# Patient Record
Sex: FEMALE | Race: WHITE | NOT HISPANIC OR LATINO | Employment: OTHER | URBAN - METROPOLITAN AREA
[De-identification: names, ages, dates, MRNs, and addresses within clinical notes are randomized per-mention and may not be internally consistent; named-entity substitution may affect disease eponyms.]

---

## 2017-01-12 ENCOUNTER — TRANSFERRED RECORDS (OUTPATIENT)
Dept: FAMILY MEDICINE | Facility: CLINIC | Age: 62
End: 2017-01-12

## 2017-01-17 ENCOUNTER — TRANSFERRED RECORDS (OUTPATIENT)
Dept: FAMILY MEDICINE | Facility: CLINIC | Age: 62
End: 2017-01-17

## 2017-01-18 ENCOUNTER — HOSPITAL ENCOUNTER (OUTPATIENT)
Dept: LAB | Facility: CLINIC | Age: 62
Discharge: HOME | End: 2017-01-18
Attending: PSYCHIATRY & NEUROLOGY | Admitting: PSYCHIATRY & NEUROLOGY
Payer: COMMERCIAL

## 2017-01-18 DIAGNOSIS — M79.7 FIBROMYALGIA: Primary | ICD-10-CM

## 2017-01-18 DIAGNOSIS — I10 HTN (HYPERTENSION): ICD-10-CM

## 2017-01-18 DIAGNOSIS — G43.909 MIGRAINE: ICD-10-CM

## 2017-01-18 DIAGNOSIS — G43.101 MIGRAINE WITH AURA AND WITH STATUS MIGRAINOSUS, NOT INTRACTABLE: ICD-10-CM

## 2017-01-18 DIAGNOSIS — R53.82 CHRONIC FATIGUE: ICD-10-CM

## 2017-01-18 LAB — DEPRECATED CALCIDIOL+CALCIFEROL SERPL-MC: 44 UG/L (ref 20–75)

## 2017-01-18 PROCEDURE — 83516 IMMUNOASSAY NONANTIBODY: CPT | Performed by: PSYCHIATRY & NEUROLOGY

## 2017-01-18 PROCEDURE — 83519 RIA NONANTIBODY: CPT | Mod: 91 | Performed by: PSYCHIATRY & NEUROLOGY

## 2017-01-18 PROCEDURE — 83516 IMMUNOASSAY NONANTIBODY: CPT | Mod: 91 | Performed by: PSYCHIATRY & NEUROLOGY

## 2017-01-18 PROCEDURE — 36415 COLL VENOUS BLD VENIPUNCTURE: CPT | Performed by: PSYCHIATRY & NEUROLOGY

## 2017-01-18 PROCEDURE — 86255 FLUORESCENT ANTIBODY SCREEN: CPT | Performed by: PSYCHIATRY & NEUROLOGY

## 2017-01-18 PROCEDURE — 82306 VITAMIN D 25 HYDROXY: CPT | Performed by: PSYCHIATRY & NEUROLOGY

## 2017-01-19 LAB
ACHR BIND AB SER-SCNC: 0 NMOL/L
ACHR BLOCK AB/ACHR TOTAL SFR SER: 0 %
STRIA MUS IGG SER QL IF: NORMAL

## 2017-01-20 LAB — ACHR MOD AB/ACHR TOTAL SFR SER: 0 %

## 2017-05-08 ENCOUNTER — TRANSFERRED RECORDS (OUTPATIENT)
Dept: FAMILY MEDICINE | Facility: CLINIC | Age: 62
End: 2017-05-08

## 2017-09-01 ENCOUNTER — HEALTH MAINTENANCE LETTER (OUTPATIENT)
Age: 62
End: 2017-09-01

## 2018-01-09 ENCOUNTER — NURSE TRIAGE (OUTPATIENT)
Dept: NURSING | Facility: CLINIC | Age: 63
End: 2018-01-09

## 2018-01-10 NOTE — TELEPHONE ENCOUNTER
Pt takes Bupropion 200mg in the morning.  By mistake she took another 200mg of Bupropion this evening with her Lyrica and Trazodone.  Pt denies having any s/s.  Connected her with the poison control pharmacist.      Reason for Disposition    MORE THAN A DOUBLE DOSE of a prescription or over-the-counter (OTC) drug    Additional Information    Negative: Drug overdose and nurse unable to answer question    Negative: Caller requesting information not related to medicine    Negative: Caller requesting a prescription for Strep throat and has a positive culture result    Negative: Rash while taking a medication or within 3 days of stopping it    Negative: Immunization reaction suspected    Negative: [1] Asthma and [2] having symptoms of asthma (cough, wheezing, etc)    Protocols used: MEDICATION QUESTION CALL-ADULT-

## 2018-04-05 ENCOUNTER — OFFICE VISIT (OUTPATIENT)
Dept: FAMILY MEDICINE | Facility: CLINIC | Age: 63
End: 2018-04-05

## 2018-04-05 VITALS
BODY MASS INDEX: 34.37 KG/M2 | HEART RATE: 87 BPM | TEMPERATURE: 98.9 F | OXYGEN SATURATION: 91 % | SYSTOLIC BLOOD PRESSURE: 124 MMHG | WEIGHT: 194 LBS | DIASTOLIC BLOOD PRESSURE: 74 MMHG

## 2018-04-05 DIAGNOSIS — R05.3 CHRONIC COUGH: Primary | ICD-10-CM

## 2018-04-05 DIAGNOSIS — Z12.39 SCREENING FOR MALIGNANT NEOPLASM OF BREAST: ICD-10-CM

## 2018-04-05 PROCEDURE — 99213 OFFICE O/P EST LOW 20 MIN: CPT | Performed by: FAMILY MEDICINE

## 2018-04-05 RX ORDER — OMEPRAZOLE 40 MG/1
40 CAPSULE, DELAYED RELEASE ORAL DAILY
Qty: 30 CAPSULE | Refills: 1 | Status: SHIPPED | OUTPATIENT
Start: 2018-04-05 | End: 2018-11-29

## 2018-04-05 RX ORDER — BENZONATATE 200 MG/1
200 CAPSULE ORAL 3 TIMES DAILY PRN
Qty: 30 CAPSULE | Refills: 1 | Status: SHIPPED | OUTPATIENT
Start: 2018-04-05 | End: 2018-11-29

## 2018-04-05 RX ORDER — BUPROPION HYDROCHLORIDE 100 MG/1
100 TABLET ORAL 2 TIMES DAILY
COMMUNITY
End: 2018-11-29 | Stop reason: DRUGHIGH

## 2018-04-05 NOTE — PROGRESS NOTES
SUBJECTIVE:  62 year old female presents with the following concern:    Since last September she has been bothered with an intermittent cough-    Coughing for the past two weeks-  Runny nose at times  Cough is dry  Loosing sleep  No fever  No body aches  At times short of breath- little crackles  No history of asthma   No history of allergies  No fever  Not taking omeprazole- she does have symptoms of heart burn     works at [employer] as a -did a strep test- negative  Patient Active Problem List   Diagnosis     Migraine with aura     Obesity     Primary localized osteoarthrosis, lower leg     Sleep apnea syndrome     Cervicalgia     Brachial neuritis or radiculitis     Myalgia and myositis     Health Care Home     Other and unspecified noninfectious gastroenteritis and colitis(558.9)     Pancreatitis     Gastroesophageal reflux disease without esophagitis     ACP (advance care planning)     S/P total knee arthroplasty     Fibromyalgia     Chronic fatigue     Past Surgical History:   Procedure Laterality Date     ARTHROPLASTY KNEE Left 9/15/2015    Procedure: ARTHROPLASTY KNEE;  Surgeon: Gavin Marcos MD;  Location: [facility] OR     ARTHROTOMY SHOULDER, ROTATOR CUFF REPAIR, COMBINED  5/22/2012    Procedure:COMBINED ARTHROTOMY SHOULDER, ROTATOR CUFF REPAIR; Left Shoulder Open Rotator Cuff Repair        C TOTAL KNEE ARTHROPLASTY  12/2007    [facility] knee/ Priti     COLONOSCOPY  1/4/2015    repeat 10 yrs     COSMETIC SURGERY      upper eyelid surgery     HC COLONOSCOPY THRU STOMA, DIAGNOSTIC  8/31/05    collagenous colitis; repeat in 10 years     HC KNEE SCOPE, DIAGNOSTIC  9/2007    Arthroscopy, Knee/ Dr Marcos     HC REMOVAL GALLBLADDER  1977    Cholecystectomy     UPPER GI ENDOSCOPY  7/2010    Normal     Current Outpatient Prescriptions   Medication     buPROPion (WELLBUTRIN) 100 MG tablet     omeprazole (PRILOSEC) 40 MG capsule     benzonatate (TESSALON) 200 MG capsule     clobetasol propionate 0.05 %  SHAM     pregabalin (LYRICA) 150 MG capsule     multivitamin, therapeutic with minerals (THERA-VIT-M) TABS     traZODone (DESYREL) 50 MG tablet     SUMAtriptan (IMITREX) 50 MG tablet     Cholecalciferol (VITAMIN D3 PO)     No current facility-administered medications for this visit.      OBJECTIVE:  /74 (BP Location: Left arm, Patient Position: Sitting, Cuff Size: Adult Regular)  Pulse 87  Temp 98.9  F (37.2  C) (Oral)  Wt 88 kg (194 lb)  LMP 02/12/2001  SpO2 91%  BMI 34.37 kg/m2  No acute distress  External ears  and canals clear bilaterally. TM's normal bilaterally. Nose normal without lesions or discharge. Oropharynx normal. Neck supple without palpable adenopathy.  Chest is clear  Heart; RRR without significant murmur    Assessment   Chronic, intermittent cough- ?? GERD  Acute cough symptoms- ? viral    PLAN:  Trial PPI  Cough suppressant  Call back with progress-

## 2018-04-05 NOTE — MR AVS SNAPSHOT
After Visit Summary   4/5/2018    Landy Fierro    MRN: [id number]           Patient Information     Date Of Birth          1955        Visit Information        Provider Department      4/5/2018 10:45 AM Sandra Olivera MD [facility]        Today's Diagnoses     Chronic cough    -  1    Screening for malignant neoplasm of breast           Follow-ups after your visit        Who to contact     If you have questions or need follow up information about today's clinic visit or your schedule please contact [facility] directly at [phone].  Normal or non-critical lab and imaging results will be communicated to you by [employer], letter or phone within 4 business days after the clinic has received the results. If you do not hear from us within 7 days, please contact the clinic through [employer] or phone. If you have a critical or abnormal lab result, we will notify you by phone as soon as possible.  Submit refill requests through [employer] or call your pharmacy and they will forward the refill request to us. Please allow 3 business days for your refill to be completed.          Additional Information About Your Visit        MyChart Information     [employer] gives you secure access to your electronic health record. If you see a primary care provider, you can also send messages to your care team and make appointments. If you have questions, please call your primary care clinic.  If you do not have a primary care provider, please call [phone] and they will assist you.        Care EveryWhere ID     This is your Care EveryWhere ID. This could be used by other organizations to access your [facility] medical records  [id number]        Your Vitals Were     Pulse Temperature Last Period Pulse Oximetry BMI (Body Mass Index)       87 98.9  F (37.2  C) (Oral) 02/12/2001 91% 34.37 kg/m2        Blood Pressure from Last 3 Encounters:   04/05/18 124/74   05/24/16  106/76   09/18/15 104/40    Weight from Last 3 Encounters:   04/05/18 88 kg (194 lb)   05/24/16 92.1 kg (203 lb)   09/15/15 90.9 kg (200 lb 6.4 oz)                 Today's Medication Changes          These changes are accurate as of 4/5/18 11:59 PM.  If you have any questions, ask your nurse or doctor.               Start taking these medicines.        Dose/Directions    benzonatate 200 MG capsule   Commonly known as:  TESSALON   Used for:  Chronic cough   Started by:  Sandra Olivera MD        Dose:  200 mg   Take 1 capsule (200 mg) by mouth 3 times daily as needed for cough   Quantity:  30 capsule   Refills:  1       omeprazole 40 MG capsule   Commonly known as:  priLOSEC   Used for:  Chronic cough   Started by:  Sandra Olivera MD        Dose:  40 mg   Take 1 capsule (40 mg) by mouth daily Take 30-60 minutes before a meal.   Quantity:  30 capsule   Refills:  1            Where to get your medicines      These medications were sent to [facility]/pharmacy #[id number] - [place], [place] - [address]  [address] [address] [address]    Hours:  [facility] [employer] Phone:  [phone]     benzonatate 200 MG capsule    omeprazole 40 MG capsule                Primary Care Provider Office Phone # Fax #    Sandra Olivera [phone] [address] [address]        Equal Access to Services     [facility] AH: Hadii aad ku hadasho Soomaali, waaxda luqadaha, qaybta kaalmada adeegyada, waxay ines joseph ah. So [facility] [phone].    ATENCIÓN: Si habla español, tiene a almazan disposición servicios gratuitos de asistencia lingüística. Llame al [phone].    We comply with applicable federal civil rights laws and [place] laws. We do not discriminate on the basis of race, color, national origin, age, disability, sex, sexual orientation, or gender identity.            Thank you!     Thank you for choosing [facility]   for your care. Our goal is always to provide you with excellent care. Hearing back from our patients is one way we can continue to improve our services. Please take a few minutes to complete the written survey that you may receive in the mail after your visit with us. Thank you!             Your Updated Medication List - Protect others around you: Learn how to safely use, store and throw away your medicines at www.disposemymeds.org.          This list is accurate as of 4/5/18 11:59 PM.  Always use your most recent med list.                   Brand Name Dispense Instructions for use Diagnosis    benzonatate 200 MG capsule    TESSALON    30 capsule    Take 1 capsule (200 mg) by mouth 3 times daily as needed for cough    Chronic cough       clobetasol propionate 0.05 % Sham     1 Bottle    Apply topically daily as needed Apply sparingly to dry scalp once daily as needed.  Leave in place for 15 minutes then add water, lather and rinse thoroughly. Use for no more than 10 days    Eczema, unspecified type       LYRICA 150 MG capsule   Generic drug:  pregabalin      Take 150 mg by mouth 2 times daily        multivitamin, therapeutic with minerals Tabs tablet      Take 1 tablet by mouth every other day        omeprazole 40 MG capsule    priLOSEC    30 capsule    Take 1 capsule (40 mg) by mouth daily Take 30-60 minutes before a meal.    Chronic cough       SUMAtriptan 50 MG tablet    IMITREX     Take 1 tablet by mouth at onset of headache.        traZODone 50 MG tablet    DESYREL     Take 100 mg by mouth At Bedtime        VITAMIN D3 PO      Take 1,000 Units by mouth daily        WELLBUTRIN 100 MG tablet   Generic drug:  buPROPion      Take 100 mg by mouth 2 times daily

## 2018-04-06 ENCOUNTER — MYC MEDICAL ADVICE (OUTPATIENT)
Dept: FAMILY MEDICINE | Facility: CLINIC | Age: 63
End: 2018-04-06

## 2018-04-06 NOTE — TELEPHONE ENCOUNTER
From: Landy Fierro  To: Sandra Olivera MD  Sent: 4/6/2018 12:10 PM CDT  Subject: Updates about my health    Over night, my nose drainage has changed to yellowish opaque. Wondering if I am dealing with the virus my  is. He is under Dr Morley's care. I will stay with our plan unless I hear different from you. I have had a little relief with the benzonatate but not as much as I hoped for. Have a nice weekend. Landy

## 2018-04-10 ENCOUNTER — MYC MEDICAL ADVICE (OUTPATIENT)
Dept: FAMILY MEDICINE | Facility: CLINIC | Age: 63
End: 2018-04-10

## 2018-04-10 DIAGNOSIS — J01.90 ACUTE SINUSITIS WITH SYMPTOMS > 10 DAYS: Primary | ICD-10-CM

## 2018-04-10 RX ORDER — AZITHROMYCIN 250 MG/1
TABLET, FILM COATED ORAL
Qty: 6 TABLET | Refills: 0 | Status: SHIPPED | OUTPATIENT
Start: 2018-04-10 | End: 2018-11-29

## 2018-04-10 NOTE — TELEPHONE ENCOUNTER
From: Landy Fierro  To: Sandra Olivera MD  Sent: 4/10/2018 1:16 PM CDT  Subject: Updates about my health    My nasal discharge is yellow/yellowgreenish. With dabs of Vicks under my nose, I am keeping my nose running. This is keeping my sinus and nasal passages decongested. Coughing is still an issue, as will be for some time, I am sure. If you feel I should be on an antibiotic, please call one into [facility]. I would prefer not to be on an antibiotc but I will if it is necessary.

## 2018-06-08 ENCOUNTER — TRANSFERRED RECORDS (OUTPATIENT)
Dept: FAMILY MEDICINE | Facility: CLINIC | Age: 63
End: 2018-06-08

## 2018-06-14 ENCOUNTER — TRANSFERRED RECORDS (OUTPATIENT)
Dept: FAMILY MEDICINE | Facility: CLINIC | Age: 63
End: 2018-06-14

## 2018-06-19 ENCOUNTER — TRANSFERRED RECORDS (OUTPATIENT)
Dept: FAMILY MEDICINE | Facility: CLINIC | Age: 63
End: 2018-06-19

## 2018-07-30 ENCOUNTER — TRANSFERRED RECORDS (OUTPATIENT)
Dept: FAMILY MEDICINE | Facility: CLINIC | Age: 63
End: 2018-07-30

## 2018-09-07 ENCOUNTER — HEALTH MAINTENANCE LETTER (OUTPATIENT)
Age: 63
End: 2018-09-07

## 2018-11-29 ENCOUNTER — OFFICE VISIT (OUTPATIENT)
Dept: FAMILY MEDICINE | Facility: CLINIC | Age: 63
End: 2018-11-29

## 2018-11-29 VITALS
TEMPERATURE: 98 F | SYSTOLIC BLOOD PRESSURE: 110 MMHG | BODY MASS INDEX: 33.13 KG/M2 | HEIGHT: 63 IN | HEART RATE: 63 BPM | WEIGHT: 187 LBS | DIASTOLIC BLOOD PRESSURE: 70 MMHG | OXYGEN SATURATION: 99 %

## 2018-11-29 DIAGNOSIS — Z23 NEED FOR VACCINATION: ICD-10-CM

## 2018-11-29 DIAGNOSIS — Z00.00 ROUTINE HISTORY AND PHYSICAL EXAMINATION OF ADULT: Primary | ICD-10-CM

## 2018-11-29 DIAGNOSIS — G43.911 INTRACTABLE MIGRAINE WITH STATUS MIGRAINOSUS, UNSPECIFIED MIGRAINE TYPE: ICD-10-CM

## 2018-11-29 LAB
ERYTHROCYTE [DISTWIDTH] IN BLOOD BY AUTOMATED COUNT: 12.9 %
HCT VFR BLD AUTO: 46.3 % (ref 35–47)
HEMOGLOBIN: 15.4 G/DL (ref 11.7–15.7)
MCH RBC QN AUTO: 30.8 PG (ref 26–33)
MCHC RBC AUTO-ENTMCNC: 33.3 G/DL (ref 31–36)
MCV RBC AUTO: 92.5 FL (ref 78–100)
PLATELET COUNT - QUEST: 272 10^9/L (ref 150–375)
RBC # BLD AUTO: 5 10*12/L (ref 3.8–5.2)
WBC # BLD AUTO: 5.4 10*9/L (ref 4–11)

## 2018-11-29 PROCEDURE — 87624 HPV HI-RISK TYP POOLED RSLT: CPT | Mod: 90 | Performed by: FAMILY MEDICINE

## 2018-11-29 PROCEDURE — 86803 HEPATITIS C AB TEST: CPT | Mod: 90 | Performed by: FAMILY MEDICINE

## 2018-11-29 PROCEDURE — 87389 HIV-1 AG W/HIV-1&-2 AB AG IA: CPT | Mod: 90 | Performed by: FAMILY MEDICINE

## 2018-11-29 PROCEDURE — 80048 BASIC METABOLIC PNL TOTAL CA: CPT | Mod: 90 | Performed by: FAMILY MEDICINE

## 2018-11-29 PROCEDURE — G0101 CA SCREEN;PELVIC/BREAST EXAM: HCPCS | Performed by: FAMILY MEDICINE

## 2018-11-29 PROCEDURE — 85027 COMPLETE CBC AUTOMATED: CPT | Performed by: FAMILY MEDICINE

## 2018-11-29 PROCEDURE — 80061 LIPID PANEL: CPT | Mod: 90 | Performed by: FAMILY MEDICINE

## 2018-11-29 PROCEDURE — 36415 COLL VENOUS BLD VENIPUNCTURE: CPT | Performed by: FAMILY MEDICINE

## 2018-11-29 PROCEDURE — 84460 ALANINE AMINO (ALT) (SGPT): CPT | Mod: 90 | Performed by: FAMILY MEDICINE

## 2018-11-29 PROCEDURE — 90670 PCV13 VACCINE IM: CPT | Performed by: FAMILY MEDICINE

## 2018-11-29 PROCEDURE — 99397 PER PM REEVAL EST PAT 65+ YR: CPT | Performed by: FAMILY MEDICINE

## 2018-11-29 PROCEDURE — G0009 ADMIN PNEUMOCOCCAL VACCINE: HCPCS | Performed by: FAMILY MEDICINE

## 2018-11-29 RX ORDER — ONDANSETRON 4 MG/1
4 TABLET, FILM COATED ORAL EVERY 8 HOURS PRN
Qty: 10 TABLET | Refills: 0 | Status: SHIPPED | OUTPATIENT
Start: 2018-11-29 | End: 2021-11-29

## 2018-11-29 RX ORDER — BUPROPION HYDROCHLORIDE 300 MG/1
300 TABLET ORAL DAILY
Refills: 1 | COMMUNITY
Start: 2018-11-01

## 2018-11-29 ASSESSMENT — PATIENT HEALTH QUESTIONNAIRE - PHQ9: SUM OF ALL RESPONSES TO PHQ QUESTIONS 1-9: 12

## 2018-11-29 NOTE — NURSING NOTE
Patient is here for a full physical exam.    Pre-Visit Screening :  Immunizations : not up to date - due for pneumo vaccines   Colon Screening : is up to date  Mammogram: is due and patient will get scheduled soon  Asthma Action Test/Plan : No concerns  PHQ9 :  Patient sees psychiatry   GAD7 :  Sees psychiatrist   Patient's  BMI Body mass index is 33.13 kg/(m^2).  Questioned patient about current smoking habits.  Pt. has never smoked.  OK to leave a detailed voice message regarding today's visit Yes, phone # [phone]      ETOH screening:  Questions:  1-How often do you have a drink containing alcohol?                             1-2 times per year(s)  2-How many drinks containing alcohol do you have on a typical day when you are         Drinking?                              1   3- How often do you have 5 or more drinks on one occasion?                              never

## 2018-11-29 NOTE — MR AVS SNAPSHOT
After Visit Summary   11/29/2018    Landy Fierro    MRN: [id number]           Patient Information     Date Of Birth          1955        Visit Information        Provider Department      11/29/2018 11:00 AM Sandra Olivera MD [facility]        Today's Diagnoses     Routine history and physical examination of adult    -  1    Need for vaccination        Intractable migraine with status migrainosus, unspecified migraine type           Follow-ups after your visit        Who to contact     If you have questions or need follow up information about today's clinic visit or your schedule please contact [facility] directly at [phone].  Normal or non-critical lab and imaging results will be communicated to you by MyChart, letter or phone within 4 business days after the clinic has received the results. If you do not hear from us within 7 days, please contact the clinic through [employer] or phone. If you have a critical or abnormal lab result, we will notify you by phone as soon as possible.  Submit refill requests through [employer] or call your pharmacy and they will forward the refill request to us. Please allow 3 business days for your refill to be completed.          Additional Information About Your Visit        MyChart Information     [employer] gives you secure access to your electronic health record. If you see a primary care provider, you can also send messages to your care team and make appointments. If you have questions, please call your primary care clinic.  If you do not have a primary care provider, please call [phone] and they will assist you.        Care EveryWhere ID     This is your Care EveryWhere ID. This could be used by other organizations to access your [facility] medical records  [id number]        Your Vitals Were     Pulse Temperature Height Last Period Pulse Oximetry BMI (Body Mass Index)    63 98  F (36.7  C) (Oral) 1.6 m  "(5' 3\") 02/12/2001 99% 33.13 kg/m2       Blood Pressure from Last 3 Encounters:   11/29/18 110/70   04/05/18 124/74   05/24/16 106/76    Weight from Last 3 Encounters:   11/29/18 84.8 kg (187 lb)   04/05/18 88 kg (194 lb)   05/24/16 92.1 kg (203 lb)              We Performed the Following     ALT ([facility])     BASIC METABOLIC PANEL ([facility])     HEMOGRAM/PLATELET (BFP)     Hepatits C antibody ([facility])     HIV 1/2 Agn Mery 4th Gen w Reflex ([facility])     Lipid Profile     PNEUMOCOCCAL CONJ VACCINE 13 VALENT IM     ThinPrep Pap and HPV (mRNa E6/E7) {HPV always run}([facility])     VACCINE ADMINISTRATION, INITIAL     VENOUS COLLECTION          Today's Medication Changes          These changes are accurate as of 11/29/18 11:59 PM.  If you have any questions, ask your nurse or doctor.               Start taking these medicines.        Dose/Directions    ondansetron 4 MG tablet   Commonly known as:  ZOFRAN   Used for:  Intractable migraine with status migrainosus, unspecified migraine type   Started by:  Sandra Olivera MD        Dose:  4 mg   Take 1 tablet (4 mg) by mouth every 8 hours as needed for nausea   Quantity:  10 tablet   Refills:  0         These medicines have changed or have updated prescriptions.        Dose/Directions    buPROPion 300 MG 24 hr tablet   Commonly known as:  WELLBUTRIN XL   This may have changed:  Another medication with the same name was removed. Continue taking this medication, and follow the directions you see here.   Changed by:  Sandra Olivera MD        Dose:  300 mg   Take 300 mg by mouth daily   Refills:  1            Where to get your medicines      These medications were sent to [facility]/pharmacy #[id number] - [place], [place] - [address]  [address] [address]    Hours:  [facility] [employer] Phone:  [phone]     ondansetron 4 MG tablet                Primary Care Provider Office Phone # Fax #    Sandra Olivera [phone]       [address] [facility] [address]  [facility] [address]        Equal Access to Services     JOHNNY GUZMAN : Hadii aad ku hadsonnycheyenne Sothiago, warajatda lubebeadaha, qaybta kapaulisabelle mckeonmitaisabelle, wendie larkinmellismael clark. So [facility] [phone].    ATENCIÓN: Si habla español, tiene a almazan disposición servicios gratuitos de asistencia lingüística. Llame al [phone].    We comply with applicable federal civil rights laws and [place] laws. We do not discriminate on the basis of race, color, national origin, age, disability, sex, sexual orientation, or gender identity.            Thank you!     Thank you for choosing [facility]  for your care. Our goal is always to provide you with excellent care. Hearing back from our patients is one way we can continue to improve our services. Please take a few minutes to complete the written survey that you may receive in the mail after your visit with us. Thank you!             Your Updated Medication List - Protect others around you: Learn how to safely use, store and throw away your medicines at www.disposemymeds.org.          This list is accurate as of 11/29/18 11:59 PM.  Always use your most recent med list.                   Brand Name Dispense Instructions for use Diagnosis    buPROPion 300 MG 24 hr tablet    WELLBUTRIN XL     Take 300 mg by mouth daily        LYRICA 150 MG capsule   Generic drug:  pregabalin      Take 150 mg by mouth 2 times daily        multivitamin w/minerals tablet      Take 1 tablet by mouth every other day        ondansetron 4 MG tablet    ZOFRAN    10 tablet    Take 1 tablet (4 mg) by mouth every 8 hours as needed for nausea    Intractable migraine with status migrainosus, unspecified migraine type       SUMAtriptan 50 MG tablet    IMITREX     Take 1 tablet by mouth at onset of headache.        traZODone 50 MG tablet    DESYREL     Take 100 mg by mouth At Bedtime        VITAMIN D3 PO      Take 1,000 Units by mouth daily

## 2018-11-29 NOTE — PROGRESS NOTES
Chief Complaint: Landy Fierro is an 63 year old woman who presents for preventive health visit.      Besides routine health maintenance,  she would like to discuss :.  Under a lot of stress-  Adult daughter financial problems,  health problems, her health problems    Fibromyalgia- chronic fatigue- symptoms not controlled  Obesity- weight is down almost ten pounds this year  History of pancreatitis: no recent bouts  Sleep apnea     Review of medications- doctors involved in her care  [facility]- former patient of Dr Starkey- will need to transfer care with her retirment  [place] [facility]- once a year for CPAP recheck- compliant  [employer]- [place]- prescribes medications for depression- recent change- wellbutrin XL- 300 mg in am      Healthy Habits:  Do you get at least three servings of calcium containing foods daily (dairy, green leafy vegetables, etc.)?  Eats some processed food- preparing meals on work days is difficult.  Outside of work or daily activities, how many days per week do you exercise for 30 minutes or longer?limited by her fibromyalgia   Have you had an eye exam in the past two years? yes  Do you see a dentist twice per year? yes    PHQ-9 scores 12   Advanced directive: completed and scanned    Worries about her memory:yes  Job is more difficult- cognitive - working memory- mistakes at work  Three word memory- remembers all three    Social History   Substance Use Topics     Smoking status: Never Smoker     Smokeless tobacco: Never Used     Alcohol use No      Comment: rare     The patient does not drink >3 drinks per day nor >7 drinks per week.    Reviewed orders with patient.  Reviewed health maintenance and updated orders accordingly - No  Statin in past- tolerated      History of abnormal Pap smear: NO - age 30- 65 PAP every 3 years recommended  NO - age 30-65 PAP every 5 years with negative HPV co-testing recommended  All Histories reviewed and updated in Epic.      ROS:   ROS: 10  "point ROS neg other than the symptoms noted above in the HPI.    Migraines two a year      OBJECTIVE:  /70 (BP Location: Right arm, Patient Position: Sitting, Cuff Size: Adult Regular)  Pulse 63  Temp 98  F (36.7  C) (Oral)  Ht 1.6 m (5' 3\")  Wt 84.8 kg (187 lb)  LMP 02/12/2001  SpO2 99%  BMI 33.13 kg/m2  General appearance: Healthy    Skin: Normal. No atypical appearing moles on inspection of trunk and extremities.    External ears  and canals clear bilaterally. TM's normal bilaterally. Nose normal without lesions or discharge. Oropharynx normal. Neck supple without palpable adenopathy.    Breasts are symmetric.  No dominant, discrete, fixed  or suspicious masses are noted.  No skin or nipple changes or axillary nodes. Self exam is taught and encouraged.    Regular rate and  rhythm. S1 and S2 normal, no murmurs, clicks, gallops or rubs. No edema or JVD. Chest is clear; no wheezes or rales.    The abdomen is soft without tenderness, guarding, mass or organomegaly. Bowel sounds are normal. No CVA tenderness or inguinal adenopathy noted.    Pelvic:  Uterine prolapse- Grade 2  vulva is normal.   No palpable uterine or adnexal masses or tenderness.  Pap obtained and pending.    Rectal exam: deferred    Extremities: negative.      COUNSELING:  Reviewed preventive health counseling, as reflected in patient instructions  Special attention given to:        Regular exercise- pool therapy       Healthy diet/nutrition       Osteoporosis Prevention/Bone Health       Immunizations    ATP III Guidelines  ICSI Preventive Guidelines    Health Care Maintenance  Due for mammogram  Hep c screen  Pap smear due    ASSESSMENT/PLAN:  (Z00.00) Routine history and physical examination of adult  (primary encounter diagnosis)  Comment:   Plan: ThinPrep Pap and HPV (mRNa E6/E7) ([facility]), Hepatits C antibody ([facility]),         VENOUS COLLECTION, HIV 1/2 Agn Mery 4th Gen w         Reflex ([facility]), Lipid Profile, BASIC METABOLIC         " PANEL ([facility]), HEMOGRAM/PLATELET (BFP), ALT         ([facility])            (Z23) Need for vaccination  Comment:   Plan: VACCINE ADMINISTRATION, INITIAL, PNEUMOCOCCAL         CONJ VACCINE 13 VALENT IM            (G43.911) Intractable migraine with status migrainosus, unspecified migraine type  Comment: nausea associated with migraine- migraine medications per neurology  Plan: ondansetron (ZOFRAN) 4 MG tablet

## 2018-11-30 LAB
ALT SERPL-CCNC: 16 U/L (ref 6–29)
BUN SERPL-MCNC: 18 MG/DL (ref 7–25)
BUN/CREATININE RATIO: NORMAL (CALC) (ref 6–22)
CALCIUM SERPL-MCNC: 9.9 MG/DL (ref 8.6–10.4)
CHLORIDE SERPLBLD-SCNC: 103 MMOL/L (ref 98–110)
CHOLEST SERPL-MCNC: 204 MG/DL
CHOLEST/HDLC SERPL: 4.2 (CALC)
CO2 SERPL-SCNC: 30 MMOL/L (ref 20–32)
CREAT SERPL-MCNC: 0.94 MG/DL (ref 0.5–0.99)
EGFR AFRICAN AMERICAN - QUEST: 74 ML/MIN/1.73M2
GFR SERPL CREATININE-BSD FRML MDRD: 64 ML/MIN/1.73M2
GLUCOSE - QUEST: 94 MG/DL (ref 65–99)
HCV AB - QUEST: NORMAL
HDLC SERPL-MCNC: 49 MG/DL
HIV 1/2 AGN ABY 4TH GEN WITH REFLEX: NORMAL
LDLC SERPL CALC-MCNC: 133 MG/DL (CALC)
NONHDLC SERPL-MCNC: 155 MG/DL (CALC)
POTASSIUM SERPL-SCNC: 4.5 MMOL/L (ref 3.5–5.3)
SIGNAL TO CUT OFF - QUEST: 0.06
SODIUM SERPL-SCNC: 141 MMOL/L (ref 135–146)
TRIGL SERPL-MCNC: 113 MG/DL

## 2018-12-04 LAB
CLINICAL HISTORY - QUEST: NORMAL
COMMENT - QUEST: NORMAL
CYTOTECHNOLOGIST - QUEST: NORMAL
DESCRIPTIVE DIAGNOSIS - QUEST: NORMAL
HPV MRNA E6/E7: NOT DETECTED
LAST PAP DX - QUEST: NORMAL
LMP - QUEST: NORMAL
PREV BX DX - QUEST: NORMAL
REVIEW CYTOTECHNOLOGIST - QUEST: NORMAL
SOURCE: NORMAL
STATEMENT OF ADEQUACY - QUEST: NORMAL

## 2018-12-06 ENCOUNTER — TRANSFERRED RECORDS (OUTPATIENT)
Dept: FAMILY MEDICINE | Facility: CLINIC | Age: 63
End: 2018-12-06

## 2018-12-06 LAB — MAMMOGRAM: NORMAL

## 2019-02-26 ENCOUNTER — TRANSFERRED RECORDS (OUTPATIENT)
Dept: FAMILY MEDICINE | Facility: CLINIC | Age: 64
End: 2019-02-26

## 2019-03-12 ENCOUNTER — TELEPHONE (OUTPATIENT)
Dept: FAMILY MEDICINE | Facility: CLINIC | Age: 64
End: 2019-03-12

## 2019-03-12 NOTE — TELEPHONE ENCOUNTER
No mention of omeprazole on [facility] consult note last August    Call patient to get more details-  Office visit recommended

## 2019-03-12 NOTE — TELEPHONE ENCOUNTER
Received incoming faxed refill request for Omeprazole 40 mg capsules from the pharmacy. This is not currently on patients medication list and I do not see that it was discussed at patients physical that was on 11/29/18. Routing to Dr. Olivera for approval or denial. Do you want patient to come in and be seen first to talk more about this?

## 2019-03-12 NOTE — TELEPHONE ENCOUNTER
Spoke to patient and she stated that she believes that was an error on the pharmacy's end because she did not request this. She said that she took it a couple years ago as a trial but is not taking it now. Disregard refill request.

## 2019-09-04 ENCOUNTER — TRANSFERRED RECORDS (OUTPATIENT)
Dept: FAMILY MEDICINE | Facility: CLINIC | Age: 64
End: 2019-09-04

## 2019-09-27 ENCOUNTER — HEALTH MAINTENANCE LETTER (OUTPATIENT)
Age: 64
End: 2019-09-27

## 2019-10-02 ENCOUNTER — MYC MEDICAL ADVICE (OUTPATIENT)
Dept: FAMILY MEDICINE | Facility: CLINIC | Age: 64
End: 2019-10-02

## 2019-10-03 NOTE — TELEPHONE ENCOUNTER
Dr Mitchell?  Jennifer , please call [facility] regarding referral to a ortho that specializes in shoulder surgery and reply to patient

## 2019-12-13 ENCOUNTER — OFFICE VISIT (OUTPATIENT)
Dept: FAMILY MEDICINE | Facility: CLINIC | Age: 64
End: 2019-12-13

## 2019-12-13 VITALS
HEART RATE: 69 BPM | DIASTOLIC BLOOD PRESSURE: 64 MMHG | TEMPERATURE: 98.6 F | WEIGHT: 190 LBS | HEIGHT: 63 IN | SYSTOLIC BLOOD PRESSURE: 122 MMHG | BODY MASS INDEX: 33.66 KG/M2 | OXYGEN SATURATION: 94 %

## 2019-12-13 DIAGNOSIS — Z00.00 ROUTINE HISTORY AND PHYSICAL EXAMINATION OF ADULT: Primary | ICD-10-CM

## 2019-12-13 DIAGNOSIS — R53.82 CHRONIC FATIGUE: ICD-10-CM

## 2019-12-13 DIAGNOSIS — Z23 NEED FOR VACCINATION: ICD-10-CM

## 2019-12-13 LAB
% GRANULOCYTES: 57.2 %
BUN SERPL-MCNC: 17 MG/DL (ref 7–25)
BUN/CREATININE RATIO: 15.3 (ref 6–22)
CALCIUM SERPL-MCNC: 9.7 MG/DL (ref 8.6–10.3)
CHLORIDE SERPLBLD-SCNC: 109.8 MMOL/L (ref 98–110)
CO2 SERPL-SCNC: 25.4 MMOL/L (ref 20–32)
CREAT SERPL-MCNC: 1.11 MG/DL (ref 0.7–1.18)
GLUCOSE SERPL-MCNC: 107 MG/DL (ref 60–99)
HCT VFR BLD AUTO: 44 % (ref 35–47)
HEMOGLOBIN: 14.5 G/DL (ref 11.7–15.7)
LYMPHOCYTES NFR BLD AUTO: 33.5 %
MCH RBC QN AUTO: 31.7 PG (ref 26–33)
MCHC RBC AUTO-ENTMCNC: 33 G/DL (ref 31–36)
MCV RBC AUTO: 96 FL (ref 78–100)
MONOCYTES NFR BLD AUTO: 9.3 %
PLATELET COUNT - QUEST: 258 10^9/L (ref 150–375)
POTASSIUM SERPL-SCNC: 4.4 MMOL/L (ref 3.5–5.3)
RBC # BLD AUTO: 4.58 10*12/L (ref 3.8–5.2)
SODIUM SERPL-SCNC: 144.5 MMOL/L (ref 135–146)
WBC # BLD AUTO: 5.4 10*9/L (ref 4–11)

## 2019-12-13 PROCEDURE — G0009 ADMIN PNEUMOCOCCAL VACCINE: HCPCS | Performed by: FAMILY MEDICINE

## 2019-12-13 PROCEDURE — 80048 BASIC METABOLIC PNL TOTAL CA: CPT | Performed by: FAMILY MEDICINE

## 2019-12-13 PROCEDURE — 90732 PPSV23 VACC 2 YRS+ SUBQ/IM: CPT | Performed by: FAMILY MEDICINE

## 2019-12-13 PROCEDURE — 85025 COMPLETE CBC W/AUTO DIFF WBC: CPT | Performed by: FAMILY MEDICINE

## 2019-12-13 PROCEDURE — 36415 COLL VENOUS BLD VENIPUNCTURE: CPT | Performed by: FAMILY MEDICINE

## 2019-12-13 PROCEDURE — 84443 ASSAY THYROID STIM HORMONE: CPT | Mod: 90 | Performed by: FAMILY MEDICINE

## 2019-12-13 PROCEDURE — 99397 PER PM REEVAL EST PAT 65+ YR: CPT | Mod: 25 | Performed by: FAMILY MEDICINE

## 2019-12-13 RX ORDER — DULOXETIN HYDROCHLORIDE 30 MG/1
CAPSULE, DELAYED RELEASE ORAL
Refills: 1 | COMMUNITY
Start: 2019-11-25

## 2019-12-13 ASSESSMENT — PATIENT HEALTH QUESTIONNAIRE - PHQ9: SUM OF ALL RESPONSES TO PHQ QUESTIONS 1-9: 10

## 2019-12-13 ASSESSMENT — MIFFLIN-ST. JEOR: SCORE: 1370.96

## 2019-12-13 NOTE — PROGRESS NOTES
Chief Complaint: Landy Fierro is an 64 year old woman who presents for preventive health visit.     Health Care Maintenance  Needs shingrix vaccine  TD  prevnar  Mammogram one year ago       Besides routine health maintenance,  she would like to discuss :.   Chronic dermatitis in groin- using lamisil cream     Chronic fatigue - fibromyalgia  In PT for neck symptoms and shoulders    Healthy Habits:  Do you get at least three servings of calcium containing foods daily (dairy, green leafy vegetables, etc.)?  yes  Outside of work or daily activities, how many days per week do you exercise for 30 minutes or longer?  Not routine  Belongs to [employer]- does not have a walking routine- plans to join [employer]  Have you had an eye exam in the past two years? yes  Do you see a dentist twice per year? Yes- scheduled for an implant    PHQ-2  On wellbutrin and cymbalta - sees psychiatry- Dr Villalobos- [place]  Over the last two weeks- Have you been bothered by little interest or pleasure in doing things?  Yes  Over the last two weeks- Have you been feeling down, depressed, or hopeless?  Yes      Advanced directive:completed and scanned into epic    Mother is 96- and doing remarkably well    Social History     Tobacco Use     Smoking status: Never Smoker     Smokeless tobacco: Never Used   Substance Use Topics     Alcohol use: No     Alcohol/week: 0.0 standard drinks     Comment: rare     The patient does not drink >3 drinks per day nor >7 drinks per week.    Reviewed orders with patient.  Reviewed health maintenance and updated orders accordingly - Yes      History of abnormal Pap smear: NO - age 63 - see link Cervical Cytology Screening Guidelines  All Histories reviewed and updated in Epic.  Family history:     ROS:  CONSTITUTIONAL: NEGATIVE for fever, chills, change in weight  INTEGUMENTARY/SKIN: POSITIVE for dermatitis- groin area  EYES: NEGATIVE for vision changes or irritation  ENT: NEGATIVE for ear, mouth and throat  "problems  RESP: NEGATIVE for significant cough or SOB  BREAST: NEGATIVE for masses, tenderness or discharge  CV: NEGATIVE for chest pain, palpitations or peripheral edema  GI: NEGATIVE for nausea, abdominal pain, heartburn, or change in bowel habits  : NEGATIVE for unusual urinary or vaginal symptoms. No vaginal bleeding.  MUSCULOSKELETAL: POSITIVE for myalgias  NEURO: NEGATIVE for weakness, dizziness or paresthesias  POSITIVE for headaches  PSYCHIATRIC: POSITIVE for chronic fatigue and mood disorder, under the care of psychiatry who prescribes her duloxetine    OBJECTIVE:  /64 (BP Location: Right arm, Patient Position: Sitting, Cuff Size: Adult Large)   Pulse 69   Temp 98.6  F (37  C) (Oral)   Ht 1.6 m (5' 3\")   Wt 86.2 kg (190 lb)   LMP 02/12/2001   SpO2 94%   BMI 33.66 kg/m    General appearance: Healthy    Skin: Normal. No atypical appearing moles on inspection of trunk and extremities.    External ears  and canals clear bilaterally. TM's normal bilaterally. Nose normal without lesions or discharge. Oropharynx normal. Neck supple without palpable adenopathy.    Breasts are symmetric.  No dominant, discrete, fixed  or suspicious masses are noted.  No skin or nipple changes or axillary nodes.    Regular rate and  rhythm. S1 and S2 normal, no murmurs, clicks, gallops or rubs. No edema or JVD. Chest is clear; no wheezes or rales.    The abdomen is soft without tenderness, guarding, mass or organomegaly. Bowel sounds are normal. No CVA tenderness or inguinal adenopathy noted.    Pelvic:  deferred  Rectal exam: deferred  Extremities: negative.      COUNSELING:  Reviewed preventive health counseling, as reflected in patient instructions  Special attention given to:        Regular exercise       Healthy diet/nutrition       (Kimberly)menopause management    ATP III Guidelines  ICSI Preventive Guidelines    ASSESSMENT/PLAN:  (Z00.00) Routine history and physical examination of adult  (primary encounter " diagnosis)  Comment:   Plan: VENOUS COLLECTION, Basic Metabolic Panel (BFP),        CL AFF HEMOGRAM/PLATE/DIFF (BFP), TSH with free        T4 reflex ([facility])            (R53.82) Chronic fatigue  Comment:   Plan: VENOUS COLLECTION, Basic Metabolic Panel (BFP),        CL AFF HEMOGRAM/PLATE/DIFF (BFP), TSH with free        T4 reflex ([facility])            (Z23) Need for vaccination  Comment:   Plan: PNEUMOCOCCAL VACCINE,ADULT,SQ OR IM

## 2019-12-13 NOTE — NURSING NOTE
Chief Complaint   Patient presents with     Physical     fasting     Pre-visit Screening:  Immunizations:  not up to date - pneumo 23  Colonoscopy:  is up to date  Mammogram: is up to date  Asthma Action Test/Plan:  NA  PHQ9:  given  GAD7:  given  Questioned patient about current smoking habits Pt. has never smoked.  Ok to leave detailed message on voice mail for today's visit only yes, phone # [phone]    ACP has one

## 2019-12-14 LAB — TSH SERPL-ACNC: 1.8 MIU/L (ref 0.4–4.5)

## 2020-02-05 ENCOUNTER — MYC MEDICAL ADVICE (OUTPATIENT)
Dept: FAMILY MEDICINE | Facility: CLINIC | Age: 65
End: 2020-02-05

## 2020-02-05 NOTE — LETTER
[facility]  [address]  [place]  [address]    February 13, 2020            Landy Fierro  [address] [facility] [id number]        Dear Landy Fierro        We have the Shingrix vaccine in. You can call [phone] to make a nurse only appointment for the 2 dose vaccine series. We ask that you check with your insurance to see where you get the best coverage. It can be either at the clinic or at a pharmacy.  If you are no longer wanting this series or going to the pharmacy, please respond to let us know. If we do not hear from you, we will remove you from our waiting list            [facility]

## 2021-01-09 ENCOUNTER — HEALTH MAINTENANCE LETTER (OUTPATIENT)
Age: 66
End: 2021-01-09

## 2021-01-16 ENCOUNTER — HEALTH MAINTENANCE LETTER (OUTPATIENT)
Age: 66
End: 2021-01-16

## 2021-03-08 ENCOUNTER — IMMUNIZATION (OUTPATIENT)
Dept: NURSING | Facility: CLINIC | Age: 66
End: 2021-03-08
Payer: COMMERCIAL

## 2021-03-08 PROCEDURE — 0031A PR COVID VAC JANSSEN AD26 0.5ML: CPT

## 2021-03-08 PROCEDURE — 91303 PR COVID VAC JANSSEN AD26 0.5ML: CPT

## 2021-03-13 ENCOUNTER — HEALTH MAINTENANCE LETTER (OUTPATIENT)
Age: 66
End: 2021-03-13

## 2021-04-19 ENCOUNTER — TELEPHONE (OUTPATIENT)
Dept: FAMILY MEDICINE | Facility: CLINIC | Age: 66
End: 2021-04-19

## 2021-04-19 NOTE — TELEPHONE ENCOUNTER
Pt called to say she received her first shingrix vaccine and was wondering if she should get the second one because she ended up with a sore arm with a welt on it, chills, fever, and loss of appetite. Those are common side effects to the vaccine.

## 2021-10-23 ENCOUNTER — HEALTH MAINTENANCE LETTER (OUTPATIENT)
Age: 66
End: 2021-10-23

## 2021-11-24 NOTE — PATIENT INSTRUCTIONS
Preventive Health Recommendations    See your health care provider every year to    Review health changes.     Discuss preventive care.      Review your medicines if your doctor has prescribed any.      You no longer need a yearly Pap test unless you've had an abnormal Pap test in the past 10 years. If you have vaginal symptoms, such as bleeding or discharge, be sure to talk with your provider about a Pap test.      Every 1 to 2 years, have a mammogram.  If you are over 69, talk with your health care provider about whether or not you want to continue having screening mammograms.      Every 10 years, have a colonoscopy. Or, have a yearly FIT test (stool test). These exams will check for colon cancer.       Have a cholesterol test every 5 years, or more often if your doctor advises it.       Have a diabetes test (fasting glucose) every three years. If you are at risk for diabetes, you should have this test more often.       At age 65, have a bone density scan (DEXA) to check for osteoporosis (brittle bone disease).    Shots:    Get a flu shot each year.    Get a tetanus shot every 10 years.    Talk to your doctor about your pneumonia vaccines. There are now two you should receive - Pneumovax (PPSV 23) and Prevnar (PCV 13).    Talk to your pharmacist about the shingles vaccine.    Talk to your doctor about the hepatitis B vaccine.    Nutrition:     Eat at least 5 servings of fruits and vegetables each day.      Eat whole-grain bread, whole-wheat pasta and brown rice instead of white grains and rice.      Get adequate about Calcium and Vitamin D.     Lifestyle    Exercise at least 150 minutes a week (30 minutes a day, 5 days a week). This will help you control your weight and prevent disease.      Limit alcohol to one drink per day.      No smoking.       Wear sunscreen to prevent skin cancer.       See your dentist twice a year for an exam and cleaning.      See your eye doctor every 1 to 2 years to screen for  conditions such as glaucoma, macular degeneration, cataracts, etc.    Personalized Prevention Plan  You are due for the preventive services outlined below.  Your care team is available to assist you in scheduling these services.  If you have already completed any of these items, please share that information with your care team to update in your medical record.    Health Maintenance Due   Topic Date Due     FALL RISK ASSESSMENT  11/29/2019     Depression Assessment  06/13/2020     Discuss Advance Care Planning  09/08/2020     Mammogram  12/06/2020     Annual Wellness Visit  12/13/2020     COVID-19 Vaccine (2 - Booster for Bren series) 05/03/2021     ASSESSMENT/PLAN:   1. Routine general medical examination at a health care facility    - VENOUS COLLECTION  - Lipid Panel (BFP)  - Basic Metabolic Panel (BFP)    2. Visit for screening mammogram      - Mammo Screening digital (bilat); Future  - Radiology Referral; Future    3. Postmenopause    - Radiology Referral; Future  - Dexa hip/pelvis/spine*    4. Morbid obesity (H)      5. Fibromyalgia  Sees neurology/psychiatry.    6. Other sleep apnea  Uses cpap    7. Migraine with aura and with status migrainosus, not intractable  Sees neurology    8. Callus of foot  Use otc foot fungus cream. If persistent, followup and consider referral to dermatology or podiatry.    9. Tinea cruris  Try nystatin, keep this area dry.  - nystatin (MYCOSTATIN) 749553 UNIT/GM external cream; Apply topically 2 times daily  Dispense: 30 g; Refill: 0    10. Otorrhea of left ear  Possible chronic otitis externa. Try cortisporin drops.  - neomycin-polymyxin-hydrocortisone (CORTISPORIN) 3.5-19901-1 otic solution; Place 3 drops in ear(s) 4 times daily for 10 days  Dispense: 10 mL; Refill: 0    Patient has been advised of split billing requirements and indicates understanding: Yes  COUNSELING:  Reviewed preventive health counseling, as reflected in patient instructions       Regular exercise        "Healthy diet/nutrition    Estimated body mass index is 37.11 kg/m  as calculated from the following:    Height as of this encounter: 1.588 m (5' 2.5\").    Weight as of this encounter: 93.5 kg (206 lb 3.2 oz).    Weight management plan: Discussed healthy diet and exercise guidelines    She reports that she has never smoked. She has never used smokeless tobacco.        Mercedes Sparks MD  [facility]    "

## 2021-11-24 NOTE — PROGRESS NOTES
SUBJECTIVE:   CC: Landy Fierro is an 66 year old woman who presents for preventive health visit.       Patient has been advised of split billing requirements and indicates understanding: Yes  HPI    Here for a physical.    Has a yeast infection in the groin. Chronic problem. Uses a generic otc fungal medication. Terbinafine.    Left ear drainage. For 3 months. No pain,hasn't been seen for this in the past.    callouses on the feet, buildup.        Today's PHQ-2 Score:   PHQ-2 ( 1999 Pfizer) 5/29/2015   Q1: Little interest or pleasure in doing things 0   Q2: Feeling down, depressed or hopeless 0   PHQ-2 Score 0         Do you feel safe in your environment? Yes    Have you ever done Advance Care Planning? (For example, a Health Directive, POLST, or a discussion with a medical provider or your loved ones about your wishes): No, advance care planning information given to patient to review.  Advanced care planning was discussed at today's visit.    Social History     Tobacco Use     Smoking status: Never Smoker     Smokeless tobacco: Never Used   Substance Use Topics     Alcohol use: No     Alcohol/week: 0.0 standard drinks     Comment: rare         Alcohol Use 12/13/2019   Prescreen: >3 drinks/day or >7 drinks/week? The patient does not drink >3 drinks per day nor >7 drinks per week.       Reviewed orders with patient.  Reviewed health maintenance and updated orders accordingly - Yes  BP Readings from Last 3 Encounters:   11/29/21 120/74   12/13/19 122/64   11/29/18 110/70    Wt Readings from Last 3 Encounters:   11/29/21 93.5 kg (206 lb 3.2 oz)   12/13/19 86.2 kg (190 lb)   11/29/18 84.8 kg (187 lb)                  Patient Active Problem List   Diagnosis     Migraine with aura     Other sleep apnea     Cervicalgia     Brachial neuritis or radiculitis     Health Care Home     Other and unspecified noninfectious gastroenteritis and colitis(558.9)     Pancreatitis     Gastroesophageal reflux disease without  esophagitis     ACP (advance care planning)     S/P total knee arthroplasty     Fibromyalgia     Chronic fatigue     Chronic bilateral low back pain without sciatica     Morbid obesity (H)     Past Surgical History:   Procedure Laterality Date     ARTHROPLASTY KNEE Left 9/15/2015    Procedure: ARTHROPLASTY KNEE;  Surgeon: Gavin Marcos MD;  Location: [facility] OR     ARTHROTOMY SHOULDER, ROTATOR CUFF REPAIR, COMBINED  2012    Procedure:COMBINED ARTHROTOMY SHOULDER, ROTATOR CUFF REPAIR; Left Shoulder Open Rotator Cuff Repair        C TOTAL KNEE ARTHROPLASTY  2007    [facility] knee/ Priti     COLONOSCOPY  2015    repeat 10 yrs     COSMETIC SURGERY      upper eyelid surgery     HC KNEE SCOPE, DIAGNOSTIC  2007    Arthroscopy, Knee/ Dr Marcos     HC REMOVAL GALLBLADDER      Cholecystectomy     UPPER GI ENDOSCOPY  2010    Normal     [facility] COLONOSCOPY THRU STOMA, DIAGNOSTIC  05    collagenous colitis; repeat in 10 years       Social History     Tobacco Use     Smoking status: Never Smoker     Smokeless tobacco: Never Used   Substance Use Topics     Alcohol use: No     Alcohol/week: 0.0 standard drinks     Comment: rare     Family History   Problem Relation Age of Onset     Lipids Father      Circulatory Father         DVT     Neurologic Disorder Mother         migraines     Neurologic Disorder Sister          at age 29 from ruptured brain aneurysm     Neurologic Disorder Sister      Neurologic Disorder Sister      Neurologic Disorder Sister      Circulatory Sister         DVT         Current Outpatient Medications   Medication Sig Dispense Refill     buPROPion (WELLBUTRIN XL) 300 MG 24 hr tablet Take 300 mg by mouth daily  1     Cholecalciferol (VITAMIN D3 PO) Take 1,000 Units by mouth daily        DULoxetine (CYMBALTA) 30 MG capsule TAKE 1 CAPSULE BY MOUTH EVERY DAY  1     melatonin 5 MG tablet Take 5 mg by mouth nightly as needed for sleep       multivitamin, therapeutic with minerals  (THERA-VIT-M) TABS Take 1 tablet by mouth every other day       nystatin (MYCOSTATIN) 270140 UNIT/GM external cream Apply topically 2 times daily 30 g 0     pregabalin (LYRICA) 150 MG capsule Take 150 mg by mouth 2 times daily       SUMAtriptan (IMITREX) 50 MG tablet Take 1 tablet by mouth at onset of headache.       traZODone (DESYREL) 150 MG tablet          Breast Cancer Screening:  Any new diagnosis of family breast, ovarian, or bowel cancer? No    FHS-7: No flowsheet data found.    due now  Pertinent mammograms are reviewed under the imaging tab.    History of abnormal Pap smear: history normal. No recent vaginal bleeding.     Reviewed and updated as needed this visit by clinical staff  Tobacco  Allergies   Problems            Reviewed and updated as needed this visit by Provider               Past Medical History:   Diagnosis Date     Gastro-oesophageal reflux disease      High cholesterol      Irritable bowel syndrome     early 20's     Migraine with aura, without mention of intractable migraine without mention of status migrainosus     teenager     Migraines      Osteoarthritis     spine, knees     Other chronic pain     Joint pain for 15 years.     Pancreatic disease     developed after two previous surgeries      PONV (postoperative nausea and vomiting)      Sleep apnea     CPAP will bring on the day of surgery.      Past Surgical History:   Procedure Laterality Date     ARTHROPLASTY KNEE Left 9/15/2015    Procedure: ARTHROPLASTY KNEE;  Surgeon: Gavin Marcos MD;  Location: [facility] OR     ARTHROTOMY SHOULDER, ROTATOR CUFF REPAIR, COMBINED  5/22/2012    Procedure:COMBINED ARTHROTOMY SHOULDER, ROTATOR CUFF REPAIR; Left Shoulder Open Rotator Cuff Repair        C TOTAL KNEE ARTHROPLASTY  12/2007    [facility] knee/ Priti     COLONOSCOPY  1/4/2015    repeat 10 yrs     COSMETIC SURGERY      upper eyelid surgery     HC KNEE SCOPE, DIAGNOSTIC  9/2007    Arthroscopy, Knee/ Dr Marcos     HC REMOVAL  "GALLBLADDER  1977    Cholecystectomy     UPPER GI ENDOSCOPY  7/2010    Normal     [facility] COLONOSCOPY THRU STOMA, DIAGNOSTIC  8/31/05    collagenous colitis; repeat in 10 years       Review of Systems  CONSTITUTIONAL: NEGATIVE for fever, chills, change in weight  INTEGUMENTARY/SKIN: NEGATIVE for worrisome rashes, moles or lesions  EYES: NEGATIVE for vision changes or irritation  ENT: NEGATIVE for ear, mouth and throat problems  RESP: NEGATIVE for significant cough or SOB  BREAST: NEGATIVE for masses, tenderness or discharge  CV: NEGATIVE for chest pain, palpitations or peripheral edema  GI: NEGATIVE for nausea, abdominal pain, heartburn, or change in bowel habits  : NEGATIVE for unusual urinary or vaginal symptoms. No vaginal bleeding.  MUSCULOSKELETAL: NEGATIVE for significant arthralgias or myalgia  NEURO: NEGATIVE for weakness, dizziness or paresthesias  PSYCHIATRIC: NEGATIVE for changes in mood or affect       OBJECTIVE:   /74 (BP Location: Right arm, Patient Position: Sitting, Cuff Size: Adult Large)   Pulse 67   Temp 97.2  F (36.2  C) (Temporal)   Ht 1.588 m (5' 2.5\")   Wt 93.5 kg (206 lb 3.2 oz)   LMP 02/12/2001   SpO2 97%   BMI 37.11 kg/m    Physical Exam  GENERAL: healthy, alert and no distress  EYES: Eyes grossly normal to inspection, PERRL and conjunctivae and sclerae normal  HENT: ear canals and TM's normal, nose and mouth without ulcers or lesions  NECK: no adenopathy, no asymmetry, masses, or scars and thyroid normal to palpation  RESP: lungs clear to auscultation - no rales, rhonchi or wheezes  BREAST: normal without masses, tenderness or nipple discharge and no palpable axillary masses or adenopathy  CV: regular rate and rhythm, normal S1 S2, no S3 or S4, no murmur, click or rub, no peripheral edema and peripheral pulses strong  ABDOMEN: soft, nontender, no hepatosplenomegaly, no masses and bowel sounds normal   (female): normal female external genitalia, normal urethral meatus, vaginal " mucosa pink, moist, well rugated, and normal cervix/adnexa/uterus without masses or discharge  MS: no gross musculoskeletal defects noted, no edema  SKIN: no suspicious lesions or rashes  NEURO: Normal strength and tone, mentation intact and speech normal  PSYCH: mentation appears normal, affect normal/bright  Left ear--normal tm, no obvious drainage  Panus: and inguinal areas--patches of red irritated skin, suggestive of fungal infection  Bilateral feet: thickened skin overall, scaly    Diagnostic Test Results:  Labs reviewed in Epic  No results found for any visits on 11/29/21.    ASSESSMENT/PLAN:   1. Routine general medical examination at a health care facility    - VENOUS COLLECTION  - Lipid Panel (BFP)  - Basic Metabolic Panel (BFP)    2. Visit for screening mammogram      - Mammo Screening digital (bilat); Future  - Radiology Referral; Future    3. Postmenopause    - Radiology Referral; Future  - Dexa hip/pelvis/spine*    4. Morbid obesity (H)      5. Fibromyalgia  Sees neurology/psychiatry.    6. Other sleep apnea  Uses cpap    7. Migraine with aura and with status migrainosus, not intractable  Sees neurology    8. Callus of foot  Use otc foot fungus cream. If persistent, followup and consider referral to dermatology or podiatry.    9. Tinea cruris  Try nystatin, keep this area dry.  - nystatin (MYCOSTATIN) 885576 UNIT/GM external cream; Apply topically 2 times daily  Dispense: 30 g; Refill: 0    10. Otorrhea of left ear  Possible chronic otitis externa. Try cortisporin drops.  - neomycin-polymyxin-hydrocortisone (CORTISPORIN) 3.5-22417-0 otic solution; Place 3 drops in ear(s) 4 times daily for 10 days  Dispense: 10 mL; Refill: 0    Patient has been advised of split billing requirements and indicates understanding: Yes  COUNSELING:  Reviewed preventive health counseling, as reflected in patient instructions       Regular exercise       Healthy diet/nutrition    Estimated body mass index is 37.11 kg/m  as  "calculated from the following:    Height as of this encounter: 1.588 m (5' 2.5\").    Weight as of this encounter: 93.5 kg (206 lb 3.2 oz).    Weight management plan: Discussed healthy diet and exercise guidelines    She reports that she has never smoked. She has never used smokeless tobacco.        Mercedes Sparks MD  [facility]  "

## 2021-11-29 ENCOUNTER — OFFICE VISIT (OUTPATIENT)
Dept: FAMILY MEDICINE | Facility: CLINIC | Age: 66
End: 2021-11-29

## 2021-11-29 VITALS
HEART RATE: 67 BPM | HEIGHT: 63 IN | DIASTOLIC BLOOD PRESSURE: 74 MMHG | WEIGHT: 206.2 LBS | TEMPERATURE: 97.2 F | BODY MASS INDEX: 36.54 KG/M2 | SYSTOLIC BLOOD PRESSURE: 120 MMHG | OXYGEN SATURATION: 97 %

## 2021-11-29 DIAGNOSIS — Z12.31 VISIT FOR SCREENING MAMMOGRAM: ICD-10-CM

## 2021-11-29 DIAGNOSIS — G47.33 OBSTRUCTIVE SLEEP APNEA TREATED WITH CONTINUOUS POSITIVE AIRWAY PRESSURE (CPAP): ICD-10-CM

## 2021-11-29 DIAGNOSIS — L84 CALLUS OF FOOT: ICD-10-CM

## 2021-11-29 DIAGNOSIS — H92.12 OTORRHEA OF LEFT EAR: ICD-10-CM

## 2021-11-29 DIAGNOSIS — B35.6 TINEA CRURIS: ICD-10-CM

## 2021-11-29 DIAGNOSIS — E66.01 MORBID OBESITY (H): ICD-10-CM

## 2021-11-29 DIAGNOSIS — Z00.00 ROUTINE GENERAL MEDICAL EXAMINATION AT A HEALTH CARE FACILITY: Primary | ICD-10-CM

## 2021-11-29 DIAGNOSIS — R73.09 HIGH GLUCOSE: ICD-10-CM

## 2021-11-29 DIAGNOSIS — Z78.0 POSTMENOPAUSE: ICD-10-CM

## 2021-11-29 DIAGNOSIS — G47.39 OTHER SLEEP APNEA: ICD-10-CM

## 2021-11-29 DIAGNOSIS — M79.7 FIBROMYALGIA: ICD-10-CM

## 2021-11-29 DIAGNOSIS — G43.101 MIGRAINE WITH AURA AND WITH STATUS MIGRAINOSUS, NOT INTRACTABLE: ICD-10-CM

## 2021-11-29 PROBLEM — M79.652 PAIN IN BOTH THIGHS: Status: RESOLVED | Noted: 2018-06-19 | Resolved: 2021-11-29

## 2021-11-29 PROBLEM — M79.651 PAIN IN BOTH THIGHS: Status: RESOLVED | Noted: 2018-06-19 | Resolved: 2021-11-29

## 2021-11-29 PROBLEM — M54.50 CHRONIC BILATERAL LOW BACK PAIN WITHOUT SCIATICA: Status: ACTIVE | Noted: 2018-06-19

## 2021-11-29 PROBLEM — M79.651 PAIN IN BOTH THIGHS: Status: ACTIVE | Noted: 2018-06-19

## 2021-11-29 PROBLEM — M79.652 PAIN IN BOTH THIGHS: Status: ACTIVE | Noted: 2018-06-19

## 2021-11-29 PROBLEM — M54.10 RADICULAR PAIN: Status: ACTIVE | Noted: 2018-06-19

## 2021-11-29 PROBLEM — M54.10 RADICULAR PAIN: Status: RESOLVED | Noted: 2018-06-19 | Resolved: 2021-11-29

## 2021-11-29 PROBLEM — I10 HYPERTENSION: Status: RESOLVED | Noted: 2017-01-17 | Resolved: 2021-11-29

## 2021-11-29 PROBLEM — I10 HYPERTENSION: Status: ACTIVE | Noted: 2017-01-17

## 2021-11-29 PROBLEM — G89.29 CHRONIC BILATERAL LOW BACK PAIN WITHOUT SCIATICA: Status: ACTIVE | Noted: 2018-06-19

## 2021-11-29 LAB
BUN SERPL-MCNC: 15 MG/DL (ref 7–25)
BUN/CREATININE RATIO: 14.4 (ref 6–22)
CALCIUM SERPL-MCNC: 9.4 MG/DL (ref 8.6–10.3)
CHLORIDE SERPLBLD-SCNC: 103.4 MMOL/L (ref 98–110)
CHOLEST SERPL-MCNC: 203 MG/DL (ref 0–199)
CHOLEST/HDLC SERPL: 4 {RATIO} (ref 0–5)
CO2 SERPL-SCNC: 30.8 MMOL/L (ref 20–32)
CREAT SERPL-MCNC: 1.04 MG/DL (ref 0.6–1.3)
GLUCOSE SERPL-MCNC: 117 MG/DL (ref 60–99)
HBA1C MFR BLD: 5.5 % (ref 4–7)
HDLC SERPL-MCNC: 56 MG/DL (ref 40–150)
LDLC SERPL CALC-MCNC: 109 MG/DL (ref 0–130)
POTASSIUM SERPL-SCNC: 4.24 MMOL/L (ref 3.5–5.3)
SODIUM SERPL-SCNC: 141.6 MMOL/L (ref 135–146)
TRIGL SERPL-MCNC: 192 MG/DL (ref 0–149)

## 2021-11-29 PROCEDURE — 83036 HEMOGLOBIN GLYCOSYLATED A1C: CPT | Performed by: FAMILY MEDICINE

## 2021-11-29 PROCEDURE — 36415 COLL VENOUS BLD VENIPUNCTURE: CPT | Performed by: FAMILY MEDICINE

## 2021-11-29 PROCEDURE — 80061 LIPID PANEL: CPT | Performed by: FAMILY MEDICINE

## 2021-11-29 PROCEDURE — 99214 OFFICE O/P EST MOD 30 MIN: CPT | Mod: 25 | Performed by: FAMILY MEDICINE

## 2021-11-29 PROCEDURE — 99397 PER PM REEVAL EST PAT 65+ YR: CPT | Performed by: FAMILY MEDICINE

## 2021-11-29 PROCEDURE — 80048 BASIC METABOLIC PNL TOTAL CA: CPT | Performed by: FAMILY MEDICINE

## 2021-11-29 RX ORDER — NEOMYCIN SULFATE, POLYMYXIN B SULFATE, HYDROCORTISONE 3.5; 10000; 1 MG/ML; [USP'U]/ML; MG/ML
3 SOLUTION/ DROPS AURICULAR (OTIC) 4 TIMES DAILY
Qty: 10 ML | Refills: 0 | Status: SHIPPED | OUTPATIENT
Start: 2021-11-29 | End: 2021-12-09

## 2021-11-29 RX ORDER — NYSTATIN 100000 U/G
CREAM TOPICAL 2 TIMES DAILY
Qty: 30 G | Refills: 0 | Status: SHIPPED | OUTPATIENT
Start: 2021-11-29 | End: 2023-01-05

## 2021-11-29 RX ORDER — TRAZODONE HYDROCHLORIDE 150 MG/1
TABLET ORAL
COMMUNITY
Start: 2021-09-13

## 2021-11-29 ASSESSMENT — ANXIETY QUESTIONNAIRES
3. WORRYING TOO MUCH ABOUT DIFFERENT THINGS: MORE THAN HALF THE DAYS
GAD7 TOTAL SCORE: 4
5. BEING SO RESTLESS THAT IT IS HARD TO SIT STILL: NOT AT ALL
6. BECOMING EASILY ANNOYED OR IRRITABLE: NOT AT ALL
IF YOU CHECKED OFF ANY PROBLEMS ON THIS QUESTIONNAIRE, HOW DIFFICULT HAVE THESE PROBLEMS MADE IT FOR YOU TO DO YOUR WORK, TAKE CARE OF THINGS AT HOME, OR GET ALONG WITH OTHER PEOPLE: SOMEWHAT DIFFICULT
1. FEELING NERVOUS, ANXIOUS, OR ON EDGE: NOT AT ALL
7. FEELING AFRAID AS IF SOMETHING AWFUL MIGHT HAPPEN: NOT AT ALL
2. NOT BEING ABLE TO STOP OR CONTROL WORRYING: SEVERAL DAYS

## 2021-11-29 ASSESSMENT — MIFFLIN-ST. JEOR: SCORE: 1426.51

## 2021-11-29 ASSESSMENT — PATIENT HEALTH QUESTIONNAIRE - PHQ9: 5. POOR APPETITE OR OVEREATING: SEVERAL DAYS

## 2021-11-29 NOTE — NURSING NOTE
Chief Complaint   Patient presents with     Physical     fasting today      Ear Problem     left ear drainage, becomes crusty     Vaginal Problem     yeast infection on an doff for years      Foot Problems     foot build up, gets thick and crusty      Pre-visit Screening:  Immunizations:  up to date  Colonoscopy:  is up to date  Mammogram: is up to date  Asthma Action Test/Plan:  NA  PHQ9:  Done today  GAD7:  Done today  Questioned patient about current smoking habits Pt. has never smoked.  Ok to leave detailed message on voice mail for today's visit only Yes, phone # [phone]

## 2021-11-30 ASSESSMENT — ANXIETY QUESTIONNAIRES: GAD7 TOTAL SCORE: 4

## 2021-11-30 ASSESSMENT — PATIENT HEALTH QUESTIONNAIRE - PHQ9: SUM OF ALL RESPONSES TO PHQ QUESTIONS 1-9: 10

## 2021-12-30 ENCOUNTER — TELEPHONE (OUTPATIENT)
Dept: FAMILY MEDICINE | Facility: CLINIC | Age: 66
End: 2021-12-30

## 2021-12-30 DIAGNOSIS — Z12.31 VISIT FOR SCREENING MAMMOGRAM: Primary | ICD-10-CM

## 2021-12-30 LAB — MAMMOGRAM: NORMAL

## 2022-05-02 ENCOUNTER — TRANSFERRED RECORDS (OUTPATIENT)
Dept: FAMILY MEDICINE | Facility: CLINIC | Age: 67
End: 2022-05-02

## 2022-05-25 ENCOUNTER — TRANSFERRED RECORDS (OUTPATIENT)
Dept: FAMILY MEDICINE | Facility: CLINIC | Age: 67
End: 2022-05-25

## 2022-06-15 ENCOUNTER — OFFICE VISIT (OUTPATIENT)
Dept: FAMILY MEDICINE | Facility: CLINIC | Age: 67
End: 2022-06-15

## 2022-06-15 VITALS
WEIGHT: 202.8 LBS | HEART RATE: 73 BPM | HEIGHT: 63 IN | SYSTOLIC BLOOD PRESSURE: 118 MMHG | OXYGEN SATURATION: 96 % | TEMPERATURE: 98.2 F | BODY MASS INDEX: 35.93 KG/M2 | DIASTOLIC BLOOD PRESSURE: 72 MMHG

## 2022-06-15 DIAGNOSIS — J01.80 OTHER SUBACUTE SINUSITIS: Primary | ICD-10-CM

## 2022-06-15 PROCEDURE — 99214 OFFICE O/P EST MOD 30 MIN: CPT | Performed by: FAMILY MEDICINE

## 2022-06-15 NOTE — PROGRESS NOTES
"Assessment & Plan   Problem List Items Addressed This Visit    None     Visit Diagnoses     Other subacute sinusitis    -  Primary    Relevant Orders    Adult ENT  Referral    VENOUS COLLECTION (Completed)    ESR WESTERGREN (BFP)    HEMOGRAM PLATELET DIFF (BFP)    VENOUS COLLECTION           1. Other subacute sinusitis  Referral done to ENT. She will also come in tomorrow to check labs, will check a home covid test.  - Adult ENT  Referral; Future  - VENOUS COLLECTION  - ESR WESTERGREN (BFP); Future  - HEMOGRAM PLATELET DIFF (BFP); Future  - VENOUS COLLECTION; Future         BMI:   Estimated body mass index is 36.5 kg/m  as calculated from the following:    Height as of this encounter: 1.588 m (5' 2.5\").    Weight as of this encounter: 92 kg (202 lb 12.8 oz).         FUTURE APPOINTMENTS:       - Follow-up visit with ENT    No follow-ups on file.    Mercedes Sparks MD  [facility]    Subjective     Nursing Notes:   Rashmi Evans CMA  6/15/2022  5:30 PM  Signed  Chief Complaint   Patient presents with     Sinus Problem     Had tooth implant on upper left side of mouth done by oral surgeon on may, was put on amoxicillin for 4 days post-procedure, one week later was having sinus congestion and green drainage, they took CT which showed sinus infection was put on augmentin for 2 weeks, went in on Tuesday and CT scan showed sinus infection on right side, oral surgeon advised she follow up here     Pre-visit Screening:  Immunizations:  up to date  Colonoscopy:  is up to date  Mammogram: is up to date  Asthma Action Test/Plan:  NA  PHQ9:  NA  GAD7:  NA  Questioned patient about current smoking habits Pt. has never smoked.  Ok to leave detailed message on voice mail for today's visit only Yes, phone # [phone]           Landy Fierro is a 66 year old female who presents to clinic today for the following health issues   HPI     4 weeks ago  Had a dental implant placed by oral surgery " "in left upper jaw. Within a day, had a sinus infection starting. Symptoms were stuffy nose, blowing out green/yellow discharge. Was on amoxicillin for 4-5 days for the surgery. Then saw the oral surgeon, wasn't getting better. He took full xrays like a ct scan, they saw the infection on the scan. And narrowing in the nose, deviated septum then changed her to augmentin, just stopped it Tuesday of this week. Then Tuesday (yesterday) took another scan he could see that the left side was a little better. He told her he saw cloudiness in the right sinus. He had her using saline so then it could have been connected. He told her to come in to be seen and possibly see ENT. Will be going on vacation Saturday.         Review of Systems   Constitutional, HEENT, cardiovascular, pulmonary, gi and gu systems are negative, except as otherwise noted.      Objective    /72 (BP Location: Right arm, Patient Position: Sitting, Cuff Size: Adult Large)   Pulse 73   Temp 98.2  F (36.8  C) (Temporal)   Ht 1.588 m (5' 2.5\")   Wt 92 kg (202 lb 12.8 oz)   LMP 02/12/2001   SpO2 96%   BMI 36.50 kg/m    Body mass index is 36.5 kg/m .  Physical Exam   GENERAL: healthy, alert and no distress  EYES: Eyes grossly normal to inspection, PERRL and conjunctivae and sclerae normal  HENT: ear canals and TM's normal, nose and mouth without ulcers or lesions  NECK: no adenopathy, no asymmetry, masses, or scars and thyroid normal to palpation  RESP: lungs clear to auscultation - no rales, rhonchi or wheezes  CV: regular rate and rhythm, normal S1 S2, no S3 or S4, no murmur, click or rub, no peripheral edema and peripheral pulses strong  ABDOMEN: soft, nontender, no hepatosplenomegaly, no masses and bowel sounds normal  MS: no gross musculoskeletal defects noted, no edema  NEURO: Normal strength and tone, mentation intact and speech normal  PSYCH: mentation appears normal, affect normal/bright    No results found for any visits on 06/15/22.      "

## 2022-06-15 NOTE — NURSING NOTE
Chief Complaint   Patient presents with     Sinus Problem     Had tooth implant on upper left side of mouth done by oral surgeon on may, was put on amoxicillin for 4 days post-procedure, one week later was having sinus congestion and green drainage, they took CT which showed sinus infection was put on augmentin for 2 weeks, went in on Tuesday and CT scan showed sinus infection on right side, oral surgeon advised she follow up here     Pre-visit Screening:  Immunizations:  up to date  Colonoscopy:  is up to date  Mammogram: is up to date  Asthma Action Test/Plan:  NA  PHQ9:  NA  GAD7:  NA  Questioned patient about current smoking habits Pt. has never smoked.  Ok to leave detailed message on voice mail for today's visit only Yes, phone # [phone]

## 2022-06-16 ENCOUNTER — TELEPHONE (OUTPATIENT)
Dept: FAMILY MEDICINE | Facility: CLINIC | Age: 67
End: 2022-06-16

## 2022-06-16 DIAGNOSIS — J01.80 OTHER SUBACUTE SINUSITIS: ICD-10-CM

## 2022-06-16 LAB
% GRANULOCYTES: 55.5 %
ERYTHROCYTE [SEDIMENTATION RATE] IN BLOOD: 42 MM/HR (ref 0–20)
HCT VFR BLD AUTO: 43.6 % (ref 35–47)
HEMOGLOBIN: 14.3 G/DL (ref 11.7–15.7)
LYMPHOCYTES NFR BLD AUTO: 38.1 %
MCH RBC QN AUTO: 32.1 PG (ref 26–33)
MCHC RBC AUTO-ENTMCNC: 32.8 G/DL (ref 31–36)
MCV RBC AUTO: 97.7 FL (ref 78–100)
MONOCYTES NFR BLD AUTO: 6.4 %
PLATELET COUNT - QUEST: 343 10^9/L (ref 150–375)
RBC # BLD AUTO: 4.46 10*12/L (ref 3.8–5.2)
WBC # BLD AUTO: 7.1 10*9/L (ref 4–11)

## 2022-06-16 PROCEDURE — 36415 COLL VENOUS BLD VENIPUNCTURE: CPT | Performed by: FAMILY MEDICINE

## 2022-06-16 PROCEDURE — 85025 COMPLETE CBC W/AUTO DIFF WBC: CPT | Performed by: FAMILY MEDICINE

## 2022-06-16 PROCEDURE — 85651 RBC SED RATE NONAUTOMATED: CPT | Performed by: FAMILY MEDICINE

## 2022-06-16 NOTE — TELEPHONE ENCOUNTER
Patient is scheduled 6/17/2022 at 10:20am with a 10:10am arrival with     Dr Ava Handy  [address] [address] [phone] -- phone  [phone] -- fax     Patient was given appointment information. Patient was also advised to get CT report and notes from dentist to bring to her appt Friday June 17th at 10:20am  Referral has been faxed

## 2022-06-17 ENCOUNTER — TRANSFERRED RECORDS (OUTPATIENT)
Dept: FAMILY MEDICINE | Facility: CLINIC | Age: 67
End: 2022-06-17

## 2022-07-12 ENCOUNTER — TRANSFERRED RECORDS (OUTPATIENT)
Dept: FAMILY MEDICINE | Facility: CLINIC | Age: 67
End: 2022-07-12

## 2022-08-04 ENCOUNTER — TRANSFERRED RECORDS (OUTPATIENT)
Dept: FAMILY MEDICINE | Facility: CLINIC | Age: 67
End: 2022-08-04

## 2022-09-02 ENCOUNTER — TRANSFERRED RECORDS (OUTPATIENT)
Dept: FAMILY MEDICINE | Facility: CLINIC | Age: 67
End: 2022-09-02

## 2022-10-09 ENCOUNTER — HEALTH MAINTENANCE LETTER (OUTPATIENT)
Age: 67
End: 2022-10-09

## 2022-10-18 ENCOUNTER — TRANSFERRED RECORDS (OUTPATIENT)
Dept: FAMILY MEDICINE | Facility: CLINIC | Age: 67
End: 2022-10-18

## 2022-12-07 ENCOUNTER — TRANSFERRED RECORDS (OUTPATIENT)
Dept: FAMILY MEDICINE | Facility: CLINIC | Age: 67
End: 2022-12-07

## 2022-12-16 ENCOUNTER — TRANSFERRED RECORDS (OUTPATIENT)
Dept: FAMILY MEDICINE | Facility: CLINIC | Age: 67
End: 2022-12-16

## 2022-12-20 ENCOUNTER — TRANSFERRED RECORDS (OUTPATIENT)
Dept: FAMILY MEDICINE | Facility: CLINIC | Age: 67
End: 2022-12-20

## 2023-01-05 ENCOUNTER — OFFICE VISIT (OUTPATIENT)
Dept: FAMILY MEDICINE | Facility: CLINIC | Age: 68
End: 2023-01-05

## 2023-01-05 VITALS
TEMPERATURE: 98.1 F | HEIGHT: 63 IN | WEIGHT: 209.4 LBS | SYSTOLIC BLOOD PRESSURE: 118 MMHG | HEART RATE: 84 BPM | DIASTOLIC BLOOD PRESSURE: 78 MMHG | OXYGEN SATURATION: 96 % | BODY MASS INDEX: 37.1 KG/M2

## 2023-01-05 DIAGNOSIS — G47.33 OBSTRUCTIVE SLEEP APNEA TREATED WITH CONTINUOUS POSITIVE AIRWAY PRESSURE (CPAP): ICD-10-CM

## 2023-01-05 DIAGNOSIS — G89.29 CHRONIC THUMB PAIN, LEFT: ICD-10-CM

## 2023-01-05 DIAGNOSIS — M79.7 FIBROMYALGIA: ICD-10-CM

## 2023-01-05 DIAGNOSIS — M79.645 CHRONIC THUMB PAIN, LEFT: ICD-10-CM

## 2023-01-05 DIAGNOSIS — G43.101 MIGRAINE WITH AURA AND WITH STATUS MIGRAINOSUS, NOT INTRACTABLE: ICD-10-CM

## 2023-01-05 DIAGNOSIS — F32.89 OTHER DEPRESSION: ICD-10-CM

## 2023-01-05 DIAGNOSIS — K85.80 OTHER ACUTE PANCREATITIS WITHOUT INFECTION OR NECROSIS: ICD-10-CM

## 2023-01-05 DIAGNOSIS — Z01.818 PREOPERATIVE EXAMINATION: Primary | ICD-10-CM

## 2023-01-05 DIAGNOSIS — Z82.49 FAMILY HISTORY OF BLOOD CLOTS: ICD-10-CM

## 2023-01-05 DIAGNOSIS — K52.831 COLLAGENOUS COLITIS: ICD-10-CM

## 2023-01-05 DIAGNOSIS — K21.9 GASTROESOPHAGEAL REFLUX DISEASE WITHOUT ESOPHAGITIS: ICD-10-CM

## 2023-01-05 LAB
% GRANULOCYTES: 52.8 %
BUN SERPL-MCNC: 17 MG/DL (ref 7–25)
BUN/CREATININE RATIO: 16.7 (ref 6–22)
CALCIUM SERPL-MCNC: 9.2 MG/DL (ref 8.6–10.3)
CHLORIDE SERPLBLD-SCNC: 105.3 MMOL/L (ref 98–110)
CO2 SERPL-SCNC: 32.2 MMOL/L (ref 20–32)
CREAT SERPL-MCNC: 1.02 MG/DL (ref 0.6–1.3)
GLUCOSE SERPL-MCNC: 118 MG/DL (ref 60–99)
HCT VFR BLD AUTO: 39.6 % (ref 35–47)
HEMOGLOBIN: 12.9 G/DL (ref 11.7–15.7)
LYMPHOCYTES NFR BLD AUTO: 36.4 %
MCH RBC QN AUTO: 31.7 PG (ref 26–33)
MCHC RBC AUTO-ENTMCNC: 32.6 G/DL (ref 31–36)
MCV RBC AUTO: 97.4 FL (ref 78–100)
MONOCYTES NFR BLD AUTO: 10.8 %
PLATELET COUNT - QUEST: 245 10^9/L (ref 150–375)
POTASSIUM SERPL-SCNC: 4.75 MMOL/L (ref 3.5–5.3)
RBC # BLD AUTO: 4.07 10*12/L (ref 3.8–5.2)
SODIUM SERPL-SCNC: 140.4 MMOL/L (ref 135–146)
WBC # BLD AUTO: 5.3 10*9/L (ref 4–11)

## 2023-01-05 PROCEDURE — 99214 OFFICE O/P EST MOD 30 MIN: CPT | Performed by: FAMILY MEDICINE

## 2023-01-05 PROCEDURE — 85025 COMPLETE CBC W/AUTO DIFF WBC: CPT | Performed by: FAMILY MEDICINE

## 2023-01-05 PROCEDURE — 36415 COLL VENOUS BLD VENIPUNCTURE: CPT | Performed by: FAMILY MEDICINE

## 2023-01-05 PROCEDURE — 80048 BASIC METABOLIC PNL TOTAL CA: CPT | Performed by: FAMILY MEDICINE

## 2023-01-05 RX ORDER — CELECOXIB 100 MG/1
CAPSULE ORAL
COMMUNITY
Start: 2022-12-13 | End: 2023-11-01

## 2023-01-05 NOTE — NURSING NOTE
Chief Complaint   Patient presents with     Pre-Op Exam     Surgery/Procedure: Left hand thumb joint repair   Surgery Location: [facility]   Surgeon: Dr. Watts  Surgery Date: 01/12/23

## 2023-01-05 NOTE — PROGRESS NOTES
[facility]  [address] [place] [address]  [address]  [facility] [address]  Phone: [phone]  Fax: [phone]  Primary Provider: Mercedes Sparks  Pre-op Performing Provider: MERCEDES SPARKS      PREOPERATIVE EVALUATION:  Today's date: 2023    Landy Fierro is a 67 year old female who presents for a preoperative evaluation. ( 53)    Surgical Information:  Surgery/Procedure: Left hand thumb joint repair   Surgery Location: [facility]   Surgeon: Dr. Watts  Surgery Date: 23  Time of Surgery: AM  Where patient plans to recover: At home with family  Fax number for surgical facility: [phone]    Type of Anesthesia Anticipated: to be determined    Assessment & Plan     The proposed surgical procedure is considered LOW risk.    Problem List Items Addressed This Visit        Nervous and Auditory    Migraine with aura--followed by neurology    Relevant Medications    celecoxib (CELEBREX) 100 MG capsule    Fibromyalgia--followed by neurology       Respiratory    Obstructive sleep apnea treated with continuous positive airway pressure (CPAP)       Digestive    Collagenous colitis    Pancreatitis    Gastroesophageal reflux disease without esophagitis       Behavioral    Other depression--followed by psychiatry       Other    Family history of blood clots   Other Visit Diagnoses     Preoperative examination    -  Primary    Relevant Orders    VENOUS COLLECTION (Completed)    Basic Metabolic Panel (BFP) (Completed)    HEMOGRAM PLATELET DIFF (BFP) (Completed)    Chronic thumb pain, left            1. Preoperative examination    - VENOUS COLLECTION  - Basic Metabolic Panel (BFP)  - HEMOGRAM PLATELET DIFF (BFP)    2. Chronic thumb pain, left      3. Family history of blood clots  No known cause.    4. Gastroesophageal reflux disease without esophagitis      5. Other acute pancreatitis without infection or necrosis      6. Obstructive sleep apnea treated with continuous positive  airway pressure (CPAP)      7. Fibromyalgia--followed by neurology      8. Other depression  Controlled.    9. Migraine with aura and with status migrainosus, not intractable      10. Collagenous colitis          Risks and Recommendations:  The patient has the following additional risks and recommendations for perioperative complications:   - No identified additional risk factors other than previously addressed    Medication Instructions:  Patient is to take all scheduled medications on the day of surgery    RECOMMENDATION:  APPROVAL GIVEN to proceed with proposed procedure, without further diagnostic evaluation.        Subjective     HPI related to upcoming procedure: here for preop for left thumb, having pain, has had this for years, her job added to this. Was waiting for this and finally decided to get this done. Affects her grabbing and holding. Was a clinic , was doing a lot of hand work and holding. Is right handed.    1. No - Have you ever had a heart attack or stroke?  2. No - Have you ever had surgery on your heart or blood vessels, such as a stent, coronary (heart) bypass, or surgery on an artery in the head, neck, heart, or legs?  3. No - Do you have chest pain when you are physically active?  4. No - Do you have a history of heart failure?  5. No - Do you currently have a cold, bronchitis, or symptoms of other respiratory (head and chest) infections?  6. No - Do you have a cough, shortness of breath, or wheezing?  7. Yes - Do you or anyone in your family have a history of blood clots? Sister and dad with blood clots. Also 2 daughters have blood clots. Testing was done--nothing specific was found.  8. No - Do you or anyone in your family have a serious bleeding problem, such as long-lasting bleeding after surgeries or cuts?  9. Yes - Have you ever had anemia or been told to take iron pills? Had anemia as a teen, no problems since then  10. No - Have you had any abnormal blood loss such as  black, tarry or bloody stools, or abnormal vaginal bleeding?  11. No - Have you ever had a blood transfusion?  12. Yes - Are you willing to have a blood transfusion if it is medically needed before, during, or after your surgery?  13. Yes - Have you or anyone in your family ever had problems with anesthesia (sedation for surgery)? Postop pancreatitis  14. Yes - Do you have sleep apnea, excessive snoring, or daytime drowsiness? yes Do you have a CPAP machine? yes  15. No - Do you have any artifical heart valves or other implanted medical devices, such as a pacemaker, defibrillator, or continuous glucose monitor?  16. Yes - Do you have any artifical joints? Bilateral knees  17. No - Are you allergic to latex?  18. No - Is there any chance that you may be pregnant?    Health Care Directive:  Patient does not have a Health Care Directive or Living Will: Discussed advance care planning with patient; information given to patient to review.    Preoperative Review of :   reviewed - controlled substances reflected in medication list.          Review of Systems  CONSTITUTIONAL: NEGATIVE for fever, chills, change in weight  INTEGUMENTARY/SKIN: NEGATIVE for worrisome rashes, moles or lesions  EYES: NEGATIVE for vision changes or irritation  ENT/MOUTH: NEGATIVE for ear, mouth and throat problems  RESP: NEGATIVE for significant cough or SOB  CV: NEGATIVE for chest pain, palpitations or peripheral edema  GI: NEGATIVE for nausea, abdominal pain, heartburn, or change in bowel habits  : NEGATIVE for frequency, dysuria, or hematuria  MUSCULOSKELETAL: NEGATIVE for significant arthralgias or myalgia  NEURO: NEGATIVE for weakness, dizziness or paresthesias  ENDOCRINE: NEGATIVE for temperature intolerance, skin/hair changes  HEME: NEGATIVE for bleeding problems  PSYCHIATRIC: NEGATIVE for changes in mood or affect    Patient Active Problem List    Diagnosis Date Noted     Family history of blood clots 01/05/2023     Priority:  Medium     Other depression--followed by psychiatry 01/05/2023     Priority: Medium     Morbid obesity (H) 11/29/2021     Priority: Medium     Chronic bilateral low back pain without sciatica 06/19/2018     Priority: Medium     Fibromyalgia--followed by neurology 05/24/2016     Priority: Medium     Chronic fatigue 05/24/2016     Priority: Medium     S/P total knee arthroplasty 09/15/2015     Priority: Medium     ACP (advance care planning) 09/08/2015     Priority: Medium     Advance Care Planning 9/8/2015: ACP Review and Resources Provided:  Reviewed chart for advance care plan.  Landy CHACON Sri has no plan or code status on file. Discussed available resources and provided with information. Confirmed code status reflects current choices pending further ACP discussions.  Confirmed/documented legally designated decision maker(s). Added by Romina Miller             Gastroesophageal reflux disease without esophagitis 09/02/2015     Priority: Medium     Pancreatitis 02/23/2014     Priority: Medium     Collagenous colitis 02/13/2014     Priority: Medium     Health Care Home 09/12/2013     Priority: Medium     State Tier Level:  Tier 1  Status:  n/a  Care Coordinator:   See Letters for H Care Plan           Cervicalgia 04/13/2010     Priority: Medium     Brachial neuritis or radiculitis 04/13/2010     Priority: Medium     Problem list name updated by automated process. Provider to review       Obstructive sleep apnea treated with continuous positive airway pressure (CPAP) 11/19/2008     Priority: Medium     Migraine with aura--followed by neurology      Priority: Medium     Problem list name updated by automated process. Provider to review        Past Medical History:   Diagnosis Date     Gastro-oesophageal reflux disease      High cholesterol      Irritable bowel syndrome     early 20's     Migraines      Osteoarthritis     spine, knees     Other chronic pain     Joint pain for 15 years.     Pancreatic disease      developed after two previous surgeries      PONV (postoperative nausea and vomiting)      Sleep apnea     CPAP will bring on the day of surgery.     Past Surgical History:   Procedure Laterality Date     ARTHROPLASTY KNEE Left 09/15/2015    Procedure: ARTHROPLASTY KNEE;  Surgeon: Gavin Marcos MD;  Location: [facility] OR     ARTHROTOMY SHOULDER, ROTATOR CUFF REPAIR, COMBINED  05/22/2012    Procedure:COMBINED ARTHROTOMY SHOULDER, ROTATOR CUFF REPAIR; Left Shoulder Open Rotator Cuff Repair        COSMETIC SURGERY      upper eyelid surgery     HC KNEE SCOPE, DIAGNOSTIC  09/01/2007    Arthroscopy, Knee/ Dr Marcos     HC REMOVAL GALLBLADDER  01/01/1977    Cholecystectomy     ZZC TOTAL KNEE ARTHROPLASTY  12/01/2007    [facility] knee/ Priti     Current Outpatient Medications   Medication Sig Dispense Refill     buPROPion (WELLBUTRIN XL) 300 MG 24 hr tablet Take 300 mg by mouth daily  1     celecoxib (CELEBREX) 100 MG capsule TAKE 1 CAPSULE BY MOUTH TWICE A DAY AS NEEDED       Cholecalciferol (VITAMIN D3 PO) Take 1,000 Units by mouth daily        DULoxetine (CYMBALTA) 30 MG capsule TAKE 1 CAPSULE BY MOUTH EVERY DAY  1     melatonin 5 MG tablet Take 5 mg by mouth nightly as needed for sleep       multivitamin, therapeutic with minerals (THERA-VIT-M) TABS Take 1 tablet by mouth every other day       pregabalin (LYRICA) 150 MG capsule Take 150 mg by mouth 2 times daily       SUMAtriptan (IMITREX) 50 MG tablet Take 1 tablet by mouth at onset of headache.       traZODone (DESYREL) 150 MG tablet          Allergies   Allergen Reactions     Sulfa Drugs Rash     Bactrim        Social History     Tobacco Use     Smoking status: Never     Smokeless tobacco: Never   Substance Use Topics     Alcohol use: No     Alcohol/week: 0.0 standard drinks     Comment: rare     Family History   Problem Relation Age of Onset     Migraines Mother         migraines     Rheumatoid Arthritis Mother      Hyperlipidemia Father      Deep Vein  "Thrombosis Father      Diabetes Type 2  Father      Aneurysm Sister          at age 29 from ruptured brain aneurysm     Thyroid Disease Sister      Restless Leg Syndrome Sister      No Known Problems Sister      No Known Problems Sister      Deep Vein Thrombosis (DVT) Sister      No Known Problems Sister      Deep Vein Thrombosis Daughter      Deep Vein Thrombosis Daughter      No Known Problems Son      History   Drug Use No         Objective     /78 (BP Location: Right arm, Patient Position: Sitting, Cuff Size: Adult Large)   Pulse 84   Temp 98.1  F (36.7  C) (Temporal)   Ht 1.588 m (5' 2.5\")   Wt 95 kg (209 lb 6.4 oz)   LMP 2001   SpO2 96%   BMI 37.69 kg/m      Physical Exam    GENERAL APPEARANCE: healthy, alert and no distress     EYES: EOMI, PERRL     HENT: ear canals and TM's normal and nose and mouth without ulcers or lesions     NECK: no adenopathy, no asymmetry, masses, or scars and thyroid normal to palpation     RESP: lungs clear to auscultation - no rales, rhonchi or wheezes     CV: regular rates and rhythm, normal S1 S2, no S3 or S4 and no murmur, click or rub     ABDOMEN:  soft, nontender, no HSM or masses and bowel sounds normal     MS: extremities normal- no gross deformities noted, no evidence of inflammation in joints, FROM in all extremities.     SKIN: no suspicious lesions or rashes     NEURO: Normal strength and tone, sensory exam grossly normal, mentation intact and speech normal     PSYCH: mentation appears normal. and affect normal/bright     LYMPHATICS: No cervical adenopathy    Recent Labs   Lab Test 22  0000 21  1412 21  1330   HGB 14.3  --   --      --   --    NA  --   --  141.6   POTASSIUM  --   --  4.24   CR  --   --  1.04   A1C  --  5.5  --         Diagnostics:  Recent Results (from the past 168 hour(s))   Basic Metabolic Panel (BFP)    Collection Time: 23 12:00 AM   Result Value Ref Range    Carbon Dioxide 32.2 (A) 20 - 32 mmol/L "    Creatinine 1.02 0.60 - 1.30 mg/dL    Glucose 118 (A) 60 - 99 mg/dL    Sodium 140.4 135 - 146 mmol/L    Potassium 4.75 3.5 - 5.3 mmol/L    Chloride 105.3 98 - 110 mmol/L    Urea Nitrogen 17 7 - 25 mg/dL    Calcium 9.2 8.6 - 10.3 mg/dL    BUN/Creatinine Ratio 16.7 6 - 22   HEMOGRAM PLATELET DIFF (BFP)    Collection Time: 01/05/23 12:50 PM   Result Value Ref Range    WBC 5.3 4.0 - 11 10*9/L    RBC Count 4.07 3.8 - 5.2 10*12/L    Hemoglobin 12.9 11.7 - 15.7 g/dL    Hematocrit 39.6 35.0 - 47.0 %    MCV 97.4 78 - 100 fL    MCH 31.7 26 - 33 pg    MCHC 32.6 31 - 36 g/dL    Platelet Count 245 150 - 375 10^9/L    % Granulocytes 52.8 %    % Lymphocytes 36.4 %    % Monocytes 10.8 %          Revised Cardiac Risk Index (RCRI):  The patient has the following serious cardiovascular risks for perioperative complications:   - No serious cardiac risks = 0 points     RCRI Interpretation: 0 points: Class I (very low risk - 0.4% complication rate)           Signed Electronically by: Mercedes Sparks MD  Copy of this evaluation report is provided to requesting physician.

## 2023-01-30 ENCOUNTER — TRANSFERRED RECORDS (OUTPATIENT)
Dept: FAMILY MEDICINE | Facility: CLINIC | Age: 68
End: 2023-01-30

## 2023-02-12 ENCOUNTER — HEALTH MAINTENANCE LETTER (OUTPATIENT)
Age: 68
End: 2023-02-12

## 2023-02-16 ENCOUNTER — TRANSFERRED RECORDS (OUTPATIENT)
Dept: FAMILY MEDICINE | Facility: CLINIC | Age: 68
End: 2023-02-16

## 2023-04-17 ENCOUNTER — TRANSFERRED RECORDS (OUTPATIENT)
Dept: FAMILY MEDICINE | Facility: CLINIC | Age: 68
End: 2023-04-17

## 2023-05-02 ENCOUNTER — TRANSFERRED RECORDS (OUTPATIENT)
Dept: FAMILY MEDICINE | Facility: CLINIC | Age: 68
End: 2023-05-02

## 2023-05-04 ENCOUNTER — TRANSFERRED RECORDS (OUTPATIENT)
Dept: FAMILY MEDICINE | Facility: CLINIC | Age: 68
End: 2023-05-04

## 2023-05-10 ENCOUNTER — TRANSFERRED RECORDS (OUTPATIENT)
Dept: FAMILY MEDICINE | Facility: CLINIC | Age: 68
End: 2023-05-10

## 2023-11-01 ENCOUNTER — OFFICE VISIT (OUTPATIENT)
Dept: FAMILY MEDICINE | Facility: CLINIC | Age: 68
End: 2023-11-01

## 2023-11-01 VITALS
BODY MASS INDEX: 36.68 KG/M2 | HEIGHT: 63 IN | SYSTOLIC BLOOD PRESSURE: 116 MMHG | DIASTOLIC BLOOD PRESSURE: 76 MMHG | TEMPERATURE: 97.8 F | WEIGHT: 207 LBS | HEART RATE: 64 BPM | OXYGEN SATURATION: 96 %

## 2023-11-01 DIAGNOSIS — Z01.818 PREOPERATIVE EXAMINATION: Primary | ICD-10-CM

## 2023-11-01 DIAGNOSIS — H26.8 OTHER CATARACT OF BOTH EYES: ICD-10-CM

## 2023-11-01 DIAGNOSIS — M54.50 CHRONIC BILATERAL LOW BACK PAIN WITHOUT SCIATICA: ICD-10-CM

## 2023-11-01 DIAGNOSIS — G43.101 MIGRAINE WITH AURA AND WITH STATUS MIGRAINOSUS, NOT INTRACTABLE: ICD-10-CM

## 2023-11-01 DIAGNOSIS — M79.7 FIBROMYALGIA: ICD-10-CM

## 2023-11-01 DIAGNOSIS — F32.89 OTHER DEPRESSION: ICD-10-CM

## 2023-11-01 DIAGNOSIS — G47.33 OBSTRUCTIVE SLEEP APNEA TREATED WITH CONTINUOUS POSITIVE AIRWAY PRESSURE (CPAP): ICD-10-CM

## 2023-11-01 DIAGNOSIS — E66.01 MORBID OBESITY (H): ICD-10-CM

## 2023-11-01 DIAGNOSIS — Z82.49 FAMILY HISTORY OF BLOOD CLOTS: ICD-10-CM

## 2023-11-01 DIAGNOSIS — G89.29 CHRONIC BILATERAL LOW BACK PAIN WITHOUT SCIATICA: ICD-10-CM

## 2023-11-01 LAB
% GRANULOCYTES: 53.3 %
BUN SERPL-MCNC: 12 MG/DL (ref 7–25)
BUN/CREATININE RATIO: 11.2 (ref 6–32)
CALCIUM SERPL-MCNC: 9.8 MG/DL (ref 8.6–10.3)
CHLORIDE SERPLBLD-SCNC: 104.5 MMOL/L (ref 98–110)
CO2 SERPL-SCNC: 30.9 MMOL/L (ref 20–32)
CREAT SERPL-MCNC: 1.07 MG/DL (ref 0.6–1.3)
GLUCOSE SERPL-MCNC: 105 MG/DL (ref 60–99)
HCT VFR BLD AUTO: 44.2 % (ref 35–47)
HEMOGLOBIN: 13.9 G/DL (ref 11.7–15.7)
LYMPHOCYTES NFR BLD AUTO: 37.8 %
MCH RBC QN AUTO: 31.2 PG (ref 26–33)
MCHC RBC AUTO-ENTMCNC: 31.4 G/DL (ref 31–36)
MCV RBC AUTO: 99.2 FL (ref 78–100)
MONOCYTES NFR BLD AUTO: 8.9 %
PLATELET COUNT - QUEST: 245 10^9/L (ref 150–375)
POTASSIUM SERPL-SCNC: 4.68 MMOL/L (ref 3.5–5.3)
RBC # BLD AUTO: 4.46 10*12/L (ref 3.8–5.2)
SODIUM SERPL-SCNC: 141.9 MMOL/L (ref 135–146)
WBC # BLD AUTO: 6 10*9/L (ref 4–11)

## 2023-11-01 PROCEDURE — 85025 COMPLETE CBC W/AUTO DIFF WBC: CPT | Performed by: FAMILY MEDICINE

## 2023-11-01 PROCEDURE — 80048 BASIC METABOLIC PNL TOTAL CA: CPT | Performed by: FAMILY MEDICINE

## 2023-11-01 PROCEDURE — 36415 COLL VENOUS BLD VENIPUNCTURE: CPT | Performed by: FAMILY MEDICINE

## 2023-11-01 PROCEDURE — 99214 OFFICE O/P EST MOD 30 MIN: CPT | Performed by: FAMILY MEDICINE

## 2023-11-01 NOTE — NURSING NOTE
Chief Complaint   Patient presents with    Pre-Op Exam     Surgery/Procedure: Cataracts  Surgery Location: [facility]   Surgeon: Dr. Coleman  Surgery Date: right eye on 11/08, left eye on 11/29

## 2023-11-01 NOTE — PROGRESS NOTES
[facility]  [address] [place] [address]  [address]  [facility] [address]  Phone: [phone]  Fax: [phone]  Primary Provider: Mercedes Sparks  Pre-op Performing Provider: MERCEDES SPARKS      PREOPERATIVE EVALUATION:  Today's date: 2023    Landy is a 68 year old female who presents for a preoperative evaluation. ( 51)    Surgical Information:  Surgery/Procedure: Cataracts  Surgery Location: [facility]   Surgeon: Dr. Coleman  Surgery Date: right eye on , left eye on   Time of Surgery: TBD  Where patient plans to recover: At home with family  Fax number for surgical facility: [phone]    Assessment & Plan     The proposed surgical procedure is considered LOW risk.    Problem List Items Addressed This Visit          Nervous and Auditory    Migraine with aura--followed by neurology    Fibromyalgia--followed by neurology    Chronic bilateral low back pain without sciatica       Respiratory    Obstructive sleep apnea treated with continuous positive airway pressure (CPAP)       Digestive    Morbid obesity (H)       Behavioral    Other depression--followed by psychiatry       Other    Family history of blood clots     Other Visit Diagnoses       Preoperative examination    -  Primary    Relevant Orders    VENOUS COLLECTION (Completed)    HEMOGRAM PLATELET DIFF (BFP)    Basic Metabolic Panel (BFP)    Other cataract of both eyes                  1. Preoperative examination    - VENOUS COLLECTION  - HEMOGRAM PLATELET DIFF (BFP)  - Basic Metabolic Panel (BFP)    2. Other cataract of both eyes      3. Family history of blood clots      4. Other depression--followed by psychiatry      5. Morbid obesity (H)      6. Obstructive sleep apnea treated with continuous positive airway pressure (CPAP)      7. Fibromyalgia--followed by neurology      8. Migraine with aura and with status migrainosus, not intractable      9. Chronic bilateral low back pain without sciatica         - No identified additional risk factors other than previously addressed    Antiplatelet or Anticoagulation Medication Instructions:   - Patient is on no antiplatelet or anticoagulation medications.    Additional Medication Instructions:  Patient is to take all scheduled medications on the day of surgery EXCEPT for modifications listed below:  Hold lyrica on the morning of surgery.    RECOMMENDATION:  APPROVAL GIVEN to proceed with proposed procedure, without further diagnostic evaluation.    Subjective       HPI related to upcoming procedure: here for preop for cataracts. Symptoms include cloudy vision. Bothering her for a couple of years, finally got fed up with it.     1. No - Have you ever had a heart attack or stroke?  2. No - Have you ever had surgery on your heart or blood vessels, such as a stent, coronary (heart) bypass, or surgery on an artery in the head, neck, heart, or legs?  3. No - Do you have chest pain when you are physically active?  4. No - Do you have a history of heart failure?  5. No - Do you currently have a cold, bronchitis, or symptoms of other respiratory (head and chest) infections?  6. No - Do you have a cough, shortness of breath, or wheezing?  7. No - Do you or anyone in your family have a history of blood clots?  8. Yes - Do you or anyone in your family have a serious bleeding problem, such as long-lasting bleeding after surgeries or cuts? Sisters, dad and 2 daughters with dvt history  9. Yes - Have you ever had anemia or been told to take iron pills? Anemia as a teen  10. No - Have you had any abnormal blood loss such as black, tarry or bloody stools, or abnormal vaginal bleeding?  11. No - Have you ever had a blood transfusion?  12. Yes - Are you willing to have a blood transfusion if it is medically needed before, during, or after your surgery?  13. No - Have you or anyone in your family ever had problems with anesthesia (sedation for surgery)?  14. Yes - Do you have sleep  apnea, excessive snoring, or daytime drowsiness? Sleep apnea Do you have a CPAP machine? yes  15. No - Do you have any artifical heart valves or other implanted medical devices, such as a pacemaker, defibrillator, or continuous glucose monitor?  16. No - Do you have any artifical joints?  17. No - Are you allergic to latex?  18. No - Is there any chance that you may be pregnant?    Health Care Directive:  Patient does not have a Health Care Directive or Living Will: Discussed advance care planning with patient; information given to patient to review.    Preoperative Review of :   reviewed - lyrica on med list, #25 oxycodone prescribed in January for a surgery, no longer taking      Status of Chronic Conditions:  DEPRESSION - Patient has a long history of Depression of moderate severity requiring medication for control with recent symptoms being stable..Current symptoms of depression include none.     Review of Systems  CONSTITUTIONAL: NEGATIVE for fever, chills, change in weight  INTEGUMENTARY/SKIN: NEGATIVE for worrisome rashes, moles or lesions  EYES: NEGATIVE for vision changes or irritation  ENT/MOUTH: NEGATIVE for ear, mouth and throat problems  RESP: NEGATIVE for significant cough or SOB  CV: NEGATIVE for chest pain, palpitations or peripheral edema  GI: NEGATIVE for nausea, abdominal pain, heartburn, or change in bowel habits  : NEGATIVE for frequency, dysuria, or hematuria  MUSCULOSKELETAL: NEGATIVE for significant arthralgias or myalgia  NEURO: NEGATIVE for weakness, dizziness or paresthesias  ENDOCRINE: NEGATIVE for temperature intolerance, skin/hair changes  HEME: NEGATIVE for bleeding problems  PSYCHIATRIC: NEGATIVE for changes in mood or affect    Patient Active Problem List    Diagnosis Date Noted    Family history of blood clots 01/05/2023     Priority: Medium    Other depression--followed by psychiatry 01/05/2023     Priority: Medium    Morbid obesity (H) 11/29/2021     Priority: Medium     Chronic bilateral low back pain without sciatica 06/19/2018     Priority: Medium    Fibromyalgia--followed by neurology 05/24/2016     Priority: Medium    Chronic fatigue 05/24/2016     Priority: Medium    S/P total knee arthroplasty 09/15/2015     Priority: Medium    ACP (advance care planning) 09/08/2015     Priority: Medium     Advance Care Planning 9/8/2015: ACP Review and Resources Provided:  Reviewed chart for advance care plan.  Landy Fierro has no plan or code status on file. Discussed available resources and provided with information. Confirmed code status reflects current choices pending further ACP discussions.  Confirmed/documented legally designated decision maker(s). Added by Romina Miller            Gastroesophageal reflux disease without esophagitis 09/02/2015     Priority: Medium    Pancreatitis 02/23/2014     Priority: Medium    Collagenous colitis 02/13/2014     Priority: Medium    Health Care Home 09/12/2013     Priority: Medium     State Tier Level:  Tier 1  Status:  n/a  Care Coordinator:   See Letters for [facility] Care Plan          Cervicalgia 04/13/2010     Priority: Medium    Brachial neuritis or radiculitis 04/13/2010     Priority: Medium     Problem list name updated by automated process. Provider to review      Obstructive sleep apnea treated with continuous positive airway pressure (CPAP) 11/19/2008     Priority: Medium    Migraine with aura--followed by neurology      Priority: Medium     Problem list name updated by automated process. Provider to review        Past Medical History:   Diagnosis Date    Gastro-oesophageal reflux disease     High cholesterol     Irritable bowel syndrome     early 20's    Migraines     Osteoarthritis     spine, knees    Other chronic pain     Joint pain for 15 years.    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     Past Surgical  History:   Procedure Laterality Date    ARTHROPLASTY KNEE Left 09/15/2015    Procedure: ARTHROPLASTY KNEE;  Surgeon: Gavin Marcos MD;  Location: [facility] OR    ARTHROTOMY SHOULDER, ROTATOR CUFF REPAIR, COMBINED  2012    Procedure:COMBINED ARTHROTOMY SHOULDER, ROTATOR CUFF REPAIR; Left Shoulder Open Rotator Cuff Repair       COSMETIC SURGERY      upper eyelid surgery    HC KNEE SCOPE, DIAGNOSTIC  2007    Arthroscopy, Knee/ Dr Marcos    HC REMOVAL GALLBLADDER  1977    Cholecystectomy    ZZC TOTAL KNEE ARTHROPLASTY  2007    rigth knee/ Priti     Current Outpatient Medications   Medication Sig Dispense Refill    buPROPion (WELLBUTRIN XL) 300 MG 24 hr tablet Take 300 mg by mouth daily  1    celecoxib (CELEBREX) 100 MG capsule TAKE 1 CAPSULE BY MOUTH TWICE A DAY AS NEEDED      Cholecalciferol (VITAMIN D3 PO) Take 1,000 Units by mouth daily       DULoxetine (CYMBALTA) 30 MG capsule TAKE 1 CAPSULE BY MOUTH EVERY DAY  1    melatonin 5 MG tablet Take 5 mg by mouth nightly as needed for sleep      multivitamin, therapeutic with minerals (THERA-VIT-M) TABS Take 1 tablet by mouth every other day      pregabalin (LYRICA) 150 MG capsule Take 150 mg by mouth 2 times daily      SUMAtriptan (IMITREX) 50 MG tablet Take 1 tablet by mouth at onset of headache.      traZODone (DESYREL) 150 MG tablet          Allergies   Allergen Reactions    Sulfa Antibiotics Rash     Bactrim        Social History     Tobacco Use    Smoking status: Never     Passive exposure: Never    Smokeless tobacco: Never   Substance Use Topics    Alcohol use: No     Alcohol/week: 0.0 standard drinks of alcohol     Comment: rare     Family History   Problem Relation Age of Onset    Migraines Mother         migraines    Rheumatoid Arthritis Mother     Hyperlipidemia Father     Deep Vein Thrombosis Father     Diabetes Type 2  Father     Aneurysm Sister          at age 29 from ruptured brain aneurysm    Thyroid Disease Sister   "   Restless Leg Syndrome Sister     No Known Problems Sister     No Known Problems Sister     Deep Vein Thrombosis (DVT) Sister     No Known Problems Sister     Deep Vein Thrombosis Daughter     Deep Vein Thrombosis Daughter     No Known Problems Son      History   Drug Use No         Objective     /76 (BP Location: Right arm, Patient Position: Sitting, Cuff Size: Adult Large)   Pulse 64   Temp 97.8  F (36.6  C) (Temporal)   Ht 1.588 m (5' 2.5\")   Wt 93.9 kg (207 lb)   LMP 02/12/2001   SpO2 96%   BMI 37.26 kg/m      Physical Exam    GENERAL APPEARANCE: healthy, alert and no distress     EYES: EOMI, PERRL     HENT: ear canals and TM's normal and nose and mouth without ulcers or lesions     NECK: no adenopathy, no asymmetry, masses, or scars and thyroid normal to palpation     RESP: lungs clear to auscultation - no rales, rhonchi or wheezes     CV: regular rates and rhythm, normal S1 S2, no S3 or S4 and no murmur, click or rub     ABDOMEN:  soft, nontender, no HSM or masses and bowel sounds normal     MS: extremities normal- no gross deformities noted, no evidence of inflammation in joints, FROM in all extremities.     SKIN: no suspicious lesions or rashes     NEURO: Normal strength and tone, sensory exam grossly normal, mentation intact and speech normal     PSYCH: mentation appears normal. and affect normal/bright     LYMPHATICS: No cervical adenopathy    Recent Labs   Lab Test 01/05/23  1250 01/05/23  0000 06/16/22  0000 11/29/21  1412 11/29/21  1330   HGB 12.9  --  14.3  --   --      --  343  --   --    NA  --  140.4  --   --  141.6   POTASSIUM  --  4.75  --   --  4.24   CR  --  1.02  --   --  1.04   A1C  --   --   --  5.5  --         Diagnostics:  Recent Results (from the past 168 hour(s))   Basic Metabolic Panel (BFP)    Collection Time: 11/01/23 12:00 AM   Result Value Ref Range    Carbon Dioxide 30.9 20 - 32 mmol/L    Creatinine 1.07 0.60 - 1.30 mg/dL    Glucose 105 (A) 60 - 99 mg/dL    " Sodium 141.9 135 - 146 mmol/L    Potassium 4.68 3.5 - 5.3 mmol/L    Chloride 104.5 98 - 110 mmol/L    Urea Nitrogen 12 7 - 25 mg/dL    Calcium 9.8 8.6 - 10.3 mg/dL    BUN/Creatinine Ratio 11.2 6 - 32   HEMOGRAM PLATELET DIFF (BFP)    Collection Time: 11/01/23  1:28 PM   Result Value Ref Range    WBC 6 4.0 - 11 10*9/L    RBC Count 4.46 3.8 - 5.2 10*12/L    Hemoglobin 13.9 11.7 - 15.7 g/dL    Hematocrit 44.2 35.0 - 47.0 %    MCV 99.2 78 - 100 fL    MCH 31.2 26 - 33 pg    MCHC 31.4 31 - 36 g/dL    Platelet Count 245 150 - 375 10^9/L    % Granulocytes 53.3 %    % Lymphocytes 37.8 %    % Monocytes 8.9 %          Revised Cardiac Risk Index (RCRI):  The patient has the following serious cardiovascular risks for perioperative complications:   - No serious cardiac risks = 0 points     RCRI Interpretation: 0 points: Class I (very low risk - 0.4% complication rate)         Signed Electronically by: Mercedes Sparks MD  Copy of this evaluation report is provided to requesting physician.

## 2024-03-16 ENCOUNTER — HEALTH MAINTENANCE LETTER (OUTPATIENT)
Age: 69
End: 2024-03-16

## 2025-03-22 ENCOUNTER — HEALTH MAINTENANCE LETTER (OUTPATIENT)
Age: 70
End: 2025-03-22